# Patient Record
Sex: FEMALE | Race: WHITE | Employment: OTHER | URBAN - METROPOLITAN AREA
[De-identification: names, ages, dates, MRNs, and addresses within clinical notes are randomized per-mention and may not be internally consistent; named-entity substitution may affect disease eponyms.]

---

## 2023-01-29 ENCOUNTER — APPOINTMENT (OUTPATIENT)
Dept: CT IMAGING | Age: 73
End: 2023-01-29
Attending: STUDENT IN AN ORGANIZED HEALTH CARE EDUCATION/TRAINING PROGRAM
Payer: MEDICARE

## 2023-01-29 ENCOUNTER — HOSPITAL ENCOUNTER (EMERGENCY)
Age: 73
Discharge: ANOTHER HOSPITAL | End: 2023-01-30
Attending: STUDENT IN AN ORGANIZED HEALTH CARE EDUCATION/TRAINING PROGRAM
Payer: MEDICARE

## 2023-01-29 DIAGNOSIS — G45.9 TIA (TRANSIENT ISCHEMIC ATTACK): Primary | ICD-10-CM

## 2023-01-29 DIAGNOSIS — I65.21 RIGHT-SIDED EXTRACRANIAL CAROTID ARTERY STENOSIS: ICD-10-CM

## 2023-01-29 LAB
ALBUMIN SERPL-MCNC: 3.5 G/DL (ref 3.5–5)
ALBUMIN/GLOB SERPL: 1.1 (ref 1.1–2.2)
ALP SERPL-CCNC: 57 U/L (ref 45–117)
ALT SERPL-CCNC: 24 U/L (ref 12–78)
ANION GAP SERPL CALC-SCNC: 7 MMOL/L (ref 5–15)
APPEARANCE UR: ABNORMAL
AST SERPL W P-5'-P-CCNC: 23 U/L (ref 15–37)
BACTERIA URNS QL MICRO: ABNORMAL /HPF
BASOPHILS # BLD: 0.1 K/UL (ref 0–0.1)
BASOPHILS NFR BLD: 1 % (ref 0–1)
BILIRUB SERPL-MCNC: 0.3 MG/DL (ref 0.2–1)
BILIRUB UR QL: NEGATIVE
BUN SERPL-MCNC: 17 MG/DL (ref 6–20)
BUN/CREAT SERPL: 13 (ref 12–20)
CA-I BLD-MCNC: 9.1 MG/DL (ref 8.5–10.1)
CHLORIDE SERPL-SCNC: 105 MMOL/L (ref 97–108)
CO2 SERPL-SCNC: 27 MMOL/L (ref 21–32)
COLOR UR: ABNORMAL
CREAT SERPL-MCNC: 1.27 MG/DL (ref 0.55–1.02)
DIFFERENTIAL METHOD BLD: ABNORMAL
EOSINOPHIL # BLD: 0.3 K/UL (ref 0–0.4)
EOSINOPHIL NFR BLD: 4 % (ref 0–7)
EPITH CASTS URNS QL MICRO: ABNORMAL /LPF
ERYTHROCYTE [DISTWIDTH] IN BLOOD BY AUTOMATED COUNT: 14.2 % (ref 11.5–14.5)
ETHANOL SERPL-MCNC: <10 MG/DL
GLOBULIN SER CALC-MCNC: 3.3 G/DL (ref 2–4)
GLUCOSE BLD STRIP.AUTO-MCNC: 107 MG/DL (ref 65–100)
GLUCOSE SERPL-MCNC: 97 MG/DL (ref 65–100)
GLUCOSE UR STRIP.AUTO-MCNC: NEGATIVE MG/DL
HCT VFR BLD AUTO: 32.4 % (ref 35–47)
HGB BLD-MCNC: 10.8 G/DL (ref 11.5–16)
HGB UR QL STRIP: ABNORMAL
IMM GRANULOCYTES # BLD AUTO: 0.1 K/UL (ref 0–0.04)
IMM GRANULOCYTES NFR BLD AUTO: 1 % (ref 0–0.5)
INR PPP: 1.1 (ref 0.9–1.1)
KETONES UR QL STRIP.AUTO: NEGATIVE MG/DL
LEUKOCYTE ESTERASE UR QL STRIP.AUTO: ABNORMAL
LYMPHOCYTES # BLD: 0.9 K/UL (ref 0.8–3.5)
LYMPHOCYTES NFR BLD: 14 % (ref 12–49)
MCH RBC QN AUTO: 30.4 PG (ref 26–34)
MCHC RBC AUTO-ENTMCNC: 33.3 G/DL (ref 30–36.5)
MCV RBC AUTO: 91.3 FL (ref 80–99)
MONOCYTES # BLD: 0.5 K/UL (ref 0–1)
MONOCYTES NFR BLD: 9 % (ref 5–13)
MUCOUS THREADS URNS QL MICRO: ABNORMAL /LPF
NEUTS SEG # BLD: 4.6 K/UL (ref 1.8–8)
NEUTS SEG NFR BLD: 71 % (ref 32–75)
NITRITE UR QL STRIP.AUTO: POSITIVE
NRBC # BLD: 0 K/UL (ref 0–0.01)
NRBC BLD-RTO: 0 PER 100 WBC
PERFORMED BY, TECHID: ABNORMAL
PH UR STRIP: 5
PLATELET # BLD AUTO: 276 K/UL (ref 150–400)
PMV BLD AUTO: 9.1 FL (ref 8.9–12.9)
POTASSIUM SERPL-SCNC: 3.3 MMOL/L (ref 3.5–5.1)
PROT SERPL-MCNC: 6.8 G/DL (ref 6.4–8.2)
PROT UR STRIP-MCNC: NEGATIVE MG/DL
PROTHROMBIN TIME: 14.2 SEC (ref 11.9–14.6)
RBC # BLD AUTO: 3.55 M/UL (ref 3.8–5.2)
RBC #/AREA URNS HPF: ABNORMAL /HPF (ref 0–5)
SODIUM SERPL-SCNC: 139 MMOL/L (ref 136–145)
SP GR UR REFRACTOMETRY: 1.01 (ref 1–1.03)
TROPONIN-HIGH SENSITIVITY: 12 NG/L (ref 0–51)
UROBILINOGEN UR QL STRIP.AUTO: 0.1 EU/DL (ref 0.2–1)
WBC # BLD AUTO: 6.4 K/UL (ref 3.6–11)
WBC URNS QL MICRO: >100 /HPF (ref 0–4)

## 2023-01-29 PROCEDURE — 74011000636 HC RX REV CODE- 636: Performed by: STUDENT IN AN ORGANIZED HEALTH CARE EDUCATION/TRAINING PROGRAM

## 2023-01-29 PROCEDURE — 96375 TX/PRO/DX INJ NEW DRUG ADDON: CPT

## 2023-01-29 PROCEDURE — 99285 EMERGENCY DEPT VISIT HI MDM: CPT

## 2023-01-29 PROCEDURE — 85025 COMPLETE CBC W/AUTO DIFF WBC: CPT

## 2023-01-29 PROCEDURE — 82962 GLUCOSE BLOOD TEST: CPT

## 2023-01-29 PROCEDURE — 80053 COMPREHEN METABOLIC PANEL: CPT

## 2023-01-29 PROCEDURE — 93005 ELECTROCARDIOGRAM TRACING: CPT

## 2023-01-29 PROCEDURE — 84484 ASSAY OF TROPONIN QUANT: CPT

## 2023-01-29 PROCEDURE — 74011250636 HC RX REV CODE- 250/636: Performed by: STUDENT IN AN ORGANIZED HEALTH CARE EDUCATION/TRAINING PROGRAM

## 2023-01-29 PROCEDURE — 74011250637 HC RX REV CODE- 250/637: Performed by: STUDENT IN AN ORGANIZED HEALTH CARE EDUCATION/TRAINING PROGRAM

## 2023-01-29 PROCEDURE — 96374 THER/PROPH/DIAG INJ IV PUSH: CPT

## 2023-01-29 PROCEDURE — 81001 URINALYSIS AUTO W/SCOPE: CPT

## 2023-01-29 PROCEDURE — 82077 ASSAY SPEC XCP UR&BREATH IA: CPT

## 2023-01-29 PROCEDURE — 70450 CT HEAD/BRAIN W/O DYE: CPT

## 2023-01-29 PROCEDURE — 85610 PROTHROMBIN TIME: CPT

## 2023-01-29 PROCEDURE — 70496 CT ANGIOGRAPHY HEAD: CPT

## 2023-01-29 PROCEDURE — 36415 COLL VENOUS BLD VENIPUNCTURE: CPT

## 2023-01-29 PROCEDURE — 74011000250 HC RX REV CODE- 250: Performed by: STUDENT IN AN ORGANIZED HEALTH CARE EDUCATION/TRAINING PROGRAM

## 2023-01-29 RX ORDER — MINERAL OIL
ENEMA (ML) RECTAL
COMMUNITY
End: 2023-01-30

## 2023-01-29 RX ORDER — CYCLOBENZAPRINE HCL 10 MG
10 TABLET ORAL
COMMUNITY
Start: 2022-12-09

## 2023-01-29 RX ORDER — ERGOCALCIFEROL 1.25 MG/1
50000 CAPSULE ORAL
COMMUNITY

## 2023-01-29 RX ORDER — ESOMEPRAZOLE MAGNESIUM 40 MG/1
40 CAPSULE, DELAYED RELEASE ORAL
COMMUNITY
Start: 2023-01-06

## 2023-01-29 RX ORDER — PRAVASTATIN SODIUM 20 MG/1
20 TABLET ORAL
COMMUNITY
Start: 2022-12-09 | End: 2023-02-03

## 2023-01-29 RX ORDER — DIPHENHYDRAMINE HYDROCHLORIDE 50 MG/ML
25 INJECTION, SOLUTION INTRAMUSCULAR; INTRAVENOUS
Status: COMPLETED | OUTPATIENT
Start: 2023-01-29 | End: 2023-01-29

## 2023-01-29 RX ORDER — GABAPENTIN 600 MG/1
600 TABLET ORAL
COMMUNITY

## 2023-01-29 RX ORDER — DICLOFENAC SODIUM 10 MG/G
2 GEL TOPICAL
COMMUNITY

## 2023-01-29 RX ORDER — CARBIDOPA AND LEVODOPA 25; 100 MG/1; MG/1
1 TABLET ORAL
COMMUNITY
Start: 2023-01-03

## 2023-01-29 RX ORDER — ESCITALOPRAM OXALATE 20 MG/1
20 TABLET ORAL DAILY
COMMUNITY
Start: 2023-01-06

## 2023-01-29 RX ORDER — METOPROLOL TARTRATE 25 MG/1
25 TABLET, FILM COATED ORAL 2 TIMES DAILY
COMMUNITY
Start: 2022-12-09

## 2023-01-29 RX ORDER — CLOPIDOGREL BISULFATE 75 MG/1
75 TABLET ORAL DAILY
COMMUNITY
Start: 2023-01-03

## 2023-01-29 RX ORDER — LANOLIN ALCOHOL/MO/W.PET/CERES
325 CREAM (GRAM) TOPICAL
COMMUNITY

## 2023-01-29 RX ORDER — BUPROPION HYDROCHLORIDE 150 MG/1
300 TABLET, EXTENDED RELEASE ORAL DAILY
COMMUNITY
Start: 2023-01-03

## 2023-01-29 RX ORDER — FLUTICASONE PROPIONATE 50 MCG
SPRAY, SUSPENSION (ML) NASAL
COMMUNITY
End: 2023-01-30

## 2023-01-29 RX ORDER — GUAIFENESIN AND PHENYLEPHRINE HCL 400; 10 MG/1; MG/1
TABLET ORAL
COMMUNITY

## 2023-01-29 RX ORDER — ASCORBIC ACID 500 MG
500 TABLET ORAL
COMMUNITY

## 2023-01-29 RX ORDER — VITAMINS A AND D
CAPSULE ORAL
COMMUNITY

## 2023-01-29 RX ORDER — ARIPIPRAZOLE 15 MG/1
TABLET ORAL
COMMUNITY
Start: 2023-01-03

## 2023-01-29 RX ORDER — GUAIFENESIN 100 MG/5ML
81 LIQUID (ML) ORAL
Status: COMPLETED | OUTPATIENT
Start: 2023-01-29 | End: 2023-01-29

## 2023-01-29 RX ORDER — CHLORTHALIDONE 25 MG/1
25 TABLET ORAL DAILY
COMMUNITY
Start: 2022-12-09

## 2023-01-29 RX ADMIN — ASPIRIN 81 MG CHEWABLE TABLET 81 MG: 81 TABLET CHEWABLE at 22:43

## 2023-01-29 RX ADMIN — IOPAMIDOL 100 ML: 755 INJECTION, SOLUTION INTRAVENOUS at 20:24

## 2023-01-29 RX ADMIN — CEFTRIAXONE SODIUM 1 G: 1 INJECTION, POWDER, FOR SOLUTION INTRAMUSCULAR; INTRAVENOUS at 22:00

## 2023-01-29 RX ADMIN — DIPHENHYDRAMINE HYDROCHLORIDE 25 MG: 50 INJECTION INTRAMUSCULAR; INTRAVENOUS at 20:12

## 2023-01-30 ENCOUNTER — APPOINTMENT (OUTPATIENT)
Dept: NON INVASIVE DIAGNOSTICS | Age: 73
End: 2023-01-30
Attending: INTERNAL MEDICINE
Payer: MEDICARE

## 2023-01-30 ENCOUNTER — APPOINTMENT (OUTPATIENT)
Dept: MRI IMAGING | Age: 73
End: 2023-01-30
Attending: INTERNAL MEDICINE
Payer: MEDICARE

## 2023-01-30 ENCOUNTER — APPOINTMENT (OUTPATIENT)
Dept: GENERAL RADIOLOGY | Age: 73
End: 2023-01-30
Attending: INTERNAL MEDICINE
Payer: MEDICARE

## 2023-01-30 ENCOUNTER — HOSPITAL ENCOUNTER (INPATIENT)
Age: 73
LOS: 4 days | Discharge: HOME | End: 2023-02-03
Attending: INTERNAL MEDICINE | Admitting: INTERNAL MEDICINE
Payer: MEDICARE

## 2023-01-30 VITALS
RESPIRATION RATE: 12 BRPM | WEIGHT: 169.2 LBS | HEART RATE: 63 BPM | DIASTOLIC BLOOD PRESSURE: 71 MMHG | HEIGHT: 65 IN | SYSTOLIC BLOOD PRESSURE: 146 MMHG | TEMPERATURE: 98.1 F | OXYGEN SATURATION: 99 % | BODY MASS INDEX: 28.19 KG/M2

## 2023-01-30 DIAGNOSIS — G45.9 TIA (TRANSIENT ISCHEMIC ATTACK): Primary | ICD-10-CM

## 2023-01-30 DIAGNOSIS — I33.0 MITRAL VALVE VEGETATION: ICD-10-CM

## 2023-01-30 DIAGNOSIS — R93.1 ABNORMAL ECHOCARDIOGRAM: ICD-10-CM

## 2023-01-30 DIAGNOSIS — I63.9 ACUTE CVA (CEREBROVASCULAR ACCIDENT) (HCC): ICD-10-CM

## 2023-01-30 DIAGNOSIS — I65.21 INTERNAL CAROTID ARTERY STENOSIS, RIGHT: ICD-10-CM

## 2023-01-30 LAB
AMPHET UR QL SCN: NEGATIVE
ASPIRIN TEST, ASPIRN: 414 ARU
ATRIAL RATE: 76 BPM
BARBITURATES UR QL SCN: NEGATIVE
BENZODIAZ UR QL: NEGATIVE
BNP SERPL-MCNC: 373 PG/ML
CALCULATED P AXIS, ECG09: 83 DEGREES
CALCULATED R AXIS, ECG10: -9 DEGREES
CALCULATED T AXIS, ECG11: 52 DEGREES
CANNABINOIDS UR QL SCN: NEGATIVE
CHOLEST SERPL-MCNC: 191 MG/DL
COCAINE UR QL SCN: NEGATIVE
COMMENT, HOLDF: NORMAL
CRP SERPL-MCNC: 1.24 MG/DL (ref 0–0.6)
DIAGNOSIS, 93000: NORMAL
DRUG SCRN COMMENT,DRGCM: NORMAL
ECHO AO ROOT DIAM: 3.1 CM
ECHO AO ROOT INDEX: 1.68 CM/M2
ECHO AV AREA PEAK VELOCITY: 2.4 CM2
ECHO AV AREA/BSA PEAK VELOCITY: 1.3 CM2/M2
ECHO AV PEAK GRADIENT: 10 MMHG
ECHO AV PEAK VELOCITY: 1.6 M/S
ECHO AV VELOCITY RATIO: 0.75
ECHO LA DIAMETER INDEX: 2.17 CM/M2
ECHO LA DIAMETER: 4 CM
ECHO LA TO AORTIC ROOT RATIO: 1.29
ECHO LA VOL 2C: 69 ML (ref 22–52)
ECHO LA VOL 4C: 61 ML (ref 22–52)
ECHO LA VOLUME AREA LENGTH: 69 ML
ECHO LA VOLUME INDEX A2C: 38 ML/M2 (ref 16–34)
ECHO LA VOLUME INDEX A4C: 33 ML/M2 (ref 16–34)
ECHO LA VOLUME INDEX AREA LENGTH: 38 ML/M2 (ref 16–34)
ECHO LV E' LATERAL VELOCITY: 8 CM/S
ECHO LV E' SEPTAL VELOCITY: 6 CM/S
ECHO LV FRACTIONAL SHORTENING: 38 % (ref 28–44)
ECHO LV INTERNAL DIMENSION DIASTOLE INDEX: 2.28 CM/M2
ECHO LV INTERNAL DIMENSION DIASTOLIC: 4.2 CM (ref 3.9–5.3)
ECHO LV INTERNAL DIMENSION SYSTOLIC INDEX: 1.41 CM/M2
ECHO LV INTERNAL DIMENSION SYSTOLIC: 2.6 CM
ECHO LV IVSD: 1.2 CM (ref 0.6–0.9)
ECHO LV MASS 2D: 167.4 G (ref 67–162)
ECHO LV MASS INDEX 2D: 91 G/M2 (ref 43–95)
ECHO LV POSTERIOR WALL DIASTOLIC: 1.1 CM (ref 0.6–0.9)
ECHO LV RELATIVE WALL THICKNESS RATIO: 0.52
ECHO LVOT AREA: 3.1 CM2
ECHO LVOT DIAM: 2 CM
ECHO LVOT PEAK GRADIENT: 6 MMHG
ECHO LVOT PEAK VELOCITY: 1.2 M/S
ECHO MV A VELOCITY: 0.99 M/S
ECHO MV AREA PHT: 2.3 CM2
ECHO MV E DECELERATION TIME (DT): 325.9 MS
ECHO MV E VELOCITY: 0.73 M/S
ECHO MV E/A RATIO: 0.74
ECHO MV E/E' LATERAL: 9.13
ECHO MV E/E' RATIO (AVERAGED): 10.65
ECHO MV E/E' SEPTAL: 12.17
ECHO MV PRESSURE HALF TIME (PHT): 94.5 MS
ECHO PV MAX VELOCITY: 0.7 M/S
ECHO PV PEAK GRADIENT: 2 MMHG
ECHO RV FREE WALL PEAK S': 11 CM/S
ECHO RV INTERNAL DIMENSION: 3.7 CM
ECHO RV TAPSE: 1.7 CM (ref 1.7–?)
EST. AVERAGE GLUCOSE BLD GHB EST-MCNC: 108 MG/DL
FERRITIN SERPL-MCNC: 38 NG/ML (ref 26–388)
FOLATE SERPL-MCNC: 11.5 NG/ML (ref 5–21)
HBA1C MFR BLD: 5.4 % (ref 4–5.6)
HDLC SERPL-MCNC: 38 MG/DL
HDLC SERPL: 5 (ref 0–5)
IRON SATN MFR SERPL: 16 % (ref 20–50)
IRON SERPL-MCNC: 52 UG/DL (ref 35–150)
LDLC SERPL CALC-MCNC: 105.4 MG/DL (ref 0–100)
MAGNESIUM SERPL-MCNC: 2.3 MG/DL (ref 1.6–2.4)
METHADONE UR QL: NEGATIVE
OPIATES UR QL: NEGATIVE
P-R INTERVAL, ECG05: 220 MS
P2Y12 PLT RESPONSE,PPPR: 259 PRU (ref 194–418)
PCP UR QL: NEGATIVE
PHOSPHATE SERPL-MCNC: 3.9 MG/DL (ref 2.6–4.7)
Q-T INTERVAL, ECG07: 414 MS
QRS DURATION, ECG06: 80 MS
QTC CALCULATION (BEZET), ECG08: 465 MS
SAMPLES BEING HELD,HOLD: NORMAL
TIBC SERPL-MCNC: 334 UG/DL (ref 250–450)
TRIGL SERPL-MCNC: 238 MG/DL (ref ?–150)
TROPONIN-HIGH SENSITIVITY: 14 NG/L (ref 0–51)
TSH SERPL DL<=0.05 MIU/L-ACNC: 4.62 UIU/ML (ref 0.36–3.74)
UR CULT HOLD, URHOLD: NORMAL
UR CULT HOLD, URHOLD: NORMAL
VENTRICULAR RATE, ECG03: 76 BPM
VIT B12 SERPL-MCNC: 311 PG/ML (ref 193–986)
VLDLC SERPL CALC-MCNC: 47.6 MG/DL

## 2023-01-30 PROCEDURE — 74011250636 HC RX REV CODE- 250/636: Performed by: INTERNAL MEDICINE

## 2023-01-30 PROCEDURE — 82728 ASSAY OF FERRITIN: CPT

## 2023-01-30 PROCEDURE — 83540 ASSAY OF IRON: CPT

## 2023-01-30 PROCEDURE — 87086 URINE CULTURE/COLONY COUNT: CPT

## 2023-01-30 PROCEDURE — 85576 BLOOD PLATELET AGGREGATION: CPT

## 2023-01-30 PROCEDURE — 71045 X-RAY EXAM CHEST 1 VIEW: CPT

## 2023-01-30 PROCEDURE — 36415 COLL VENOUS BLD VENIPUNCTURE: CPT

## 2023-01-30 PROCEDURE — 80307 DRUG TEST PRSMV CHEM ANLYZR: CPT

## 2023-01-30 PROCEDURE — 92610 EVALUATE SWALLOWING FUNCTION: CPT

## 2023-01-30 PROCEDURE — 99285 EMERGENCY DEPT VISIT HI MDM: CPT

## 2023-01-30 PROCEDURE — 82607 VITAMIN B-12: CPT

## 2023-01-30 PROCEDURE — 87040 BLOOD CULTURE FOR BACTERIA: CPT

## 2023-01-30 PROCEDURE — 93306 TTE W/DOPPLER COMPLETE: CPT

## 2023-01-30 PROCEDURE — 97161 PT EVAL LOW COMPLEX 20 MIN: CPT

## 2023-01-30 PROCEDURE — 93306 TTE W/DOPPLER COMPLETE: CPT | Performed by: INTERNAL MEDICINE

## 2023-01-30 PROCEDURE — 99223 1ST HOSP IP/OBS HIGH 75: CPT | Performed by: NURSE PRACTITIONER

## 2023-01-30 PROCEDURE — 65270000046 HC RM TELEMETRY

## 2023-01-30 PROCEDURE — 80061 LIPID PANEL: CPT

## 2023-01-30 PROCEDURE — 74011000250 HC RX REV CODE- 250: Performed by: INTERNAL MEDICINE

## 2023-01-30 PROCEDURE — 84100 ASSAY OF PHOSPHORUS: CPT

## 2023-01-30 PROCEDURE — 83880 ASSAY OF NATRIURETIC PEPTIDE: CPT

## 2023-01-30 PROCEDURE — 74011250637 HC RX REV CODE- 250/637: Performed by: INTERNAL MEDICINE

## 2023-01-30 PROCEDURE — 84443 ASSAY THYROID STIM HORMONE: CPT

## 2023-01-30 PROCEDURE — 74011250637 HC RX REV CODE- 250/637: Performed by: NURSE PRACTITIONER

## 2023-01-30 PROCEDURE — 70551 MRI BRAIN STEM W/O DYE: CPT

## 2023-01-30 PROCEDURE — 83735 ASSAY OF MAGNESIUM: CPT

## 2023-01-30 PROCEDURE — 97112 NEUROMUSCULAR REEDUCATION: CPT | Performed by: OCCUPATIONAL THERAPIST

## 2023-01-30 PROCEDURE — 82746 ASSAY OF FOLIC ACID SERUM: CPT

## 2023-01-30 PROCEDURE — 84484 ASSAY OF TROPONIN QUANT: CPT

## 2023-01-30 PROCEDURE — 86140 C-REACTIVE PROTEIN: CPT

## 2023-01-30 PROCEDURE — 83036 HEMOGLOBIN GLYCOSYLATED A1C: CPT

## 2023-01-30 PROCEDURE — 97165 OT EVAL LOW COMPLEX 30 MIN: CPT | Performed by: OCCUPATIONAL THERAPIST

## 2023-01-30 RX ORDER — BISACODYL 5 MG
5 TABLET, DELAYED RELEASE (ENTERIC COATED) ORAL DAILY PRN
Status: DISCONTINUED | OUTPATIENT
Start: 2023-01-30 | End: 2023-02-03 | Stop reason: HOSPADM

## 2023-01-30 RX ORDER — ESCITALOPRAM OXALATE 10 MG/1
20 TABLET ORAL DAILY
Status: DISCONTINUED | OUTPATIENT
Start: 2023-01-30 | End: 2023-02-03 | Stop reason: HOSPADM

## 2023-01-30 RX ORDER — CARBIDOPA AND LEVODOPA 25; 100 MG/1; MG/1
1 TABLET ORAL DAILY
Status: DISCONTINUED | OUTPATIENT
Start: 2023-01-30 | End: 2023-02-03 | Stop reason: HOSPADM

## 2023-01-30 RX ORDER — POTASSIUM CHLORIDE 750 MG/1
40 TABLET, FILM COATED, EXTENDED RELEASE ORAL
Status: COMPLETED | OUTPATIENT
Start: 2023-01-30 | End: 2023-01-30

## 2023-01-30 RX ORDER — SODIUM CHLORIDE, SODIUM LACTATE, POTASSIUM CHLORIDE, CALCIUM CHLORIDE 600; 310; 30; 20 MG/100ML; MG/100ML; MG/100ML; MG/100ML
75 INJECTION, SOLUTION INTRAVENOUS CONTINUOUS
Status: DISCONTINUED | OUTPATIENT
Start: 2023-01-30 | End: 2023-01-30

## 2023-01-30 RX ORDER — CLOPIDOGREL BISULFATE 75 MG/1
75 TABLET ORAL DAILY
Status: DISCONTINUED | OUTPATIENT
Start: 2023-01-30 | End: 2023-02-03 | Stop reason: HOSPADM

## 2023-01-30 RX ORDER — BUPROPION HYDROCHLORIDE 150 MG/1
300 TABLET, EXTENDED RELEASE ORAL DAILY
Status: DISCONTINUED | OUTPATIENT
Start: 2023-01-30 | End: 2023-02-03 | Stop reason: HOSPADM

## 2023-01-30 RX ORDER — BUPROPION HYDROCHLORIDE 150 MG/1
150 TABLET, EXTENDED RELEASE ORAL
COMMUNITY

## 2023-01-30 RX ORDER — ACETAMINOPHEN 650 MG/1
650 SUPPOSITORY RECTAL
Status: DISCONTINUED | OUTPATIENT
Start: 2023-01-30 | End: 2023-02-03 | Stop reason: HOSPADM

## 2023-01-30 RX ORDER — BUPROPION HYDROCHLORIDE 150 MG/1
150 TABLET, EXTENDED RELEASE ORAL
Status: DISCONTINUED | OUTPATIENT
Start: 2023-01-30 | End: 2023-02-03 | Stop reason: HOSPADM

## 2023-01-30 RX ORDER — ACETAMINOPHEN 325 MG/1
650 TABLET ORAL
Status: DISCONTINUED | OUTPATIENT
Start: 2023-01-30 | End: 2023-02-03 | Stop reason: HOSPADM

## 2023-01-30 RX ORDER — GUAIFENESIN 100 MG/5ML
81 LIQUID (ML) ORAL DAILY
Status: DISCONTINUED | OUTPATIENT
Start: 2023-01-30 | End: 2023-02-03 | Stop reason: HOSPADM

## 2023-01-30 RX ORDER — ATORVASTATIN CALCIUM 40 MG/1
40 TABLET, FILM COATED ORAL
Status: DISCONTINUED | OUTPATIENT
Start: 2023-01-30 | End: 2023-02-03 | Stop reason: HOSPADM

## 2023-01-30 RX ORDER — PANTOPRAZOLE SODIUM 40 MG/1
40 TABLET, DELAYED RELEASE ORAL
Status: DISCONTINUED | OUTPATIENT
Start: 2023-01-30 | End: 2023-02-03 | Stop reason: HOSPADM

## 2023-01-30 RX ORDER — PRAVASTATIN SODIUM 20 MG/1
20 TABLET ORAL
Status: DISCONTINUED | OUTPATIENT
Start: 2023-01-30 | End: 2023-01-30

## 2023-01-30 RX ORDER — LANOLIN ALCOHOL/MO/W.PET/CERES
3 CREAM (GRAM) TOPICAL
Status: DISCONTINUED | OUTPATIENT
Start: 2023-01-30 | End: 2023-02-03 | Stop reason: HOSPADM

## 2023-01-30 RX ORDER — ONDANSETRON 2 MG/ML
4 INJECTION INTRAMUSCULAR; INTRAVENOUS
Status: DISCONTINUED | OUTPATIENT
Start: 2023-01-30 | End: 2023-02-03 | Stop reason: HOSPADM

## 2023-01-30 RX ORDER — ENOXAPARIN SODIUM 100 MG/ML
40 INJECTION SUBCUTANEOUS EVERY 24 HOURS
Status: DISCONTINUED | OUTPATIENT
Start: 2023-01-30 | End: 2023-02-03 | Stop reason: HOSPADM

## 2023-01-30 RX ADMIN — CEFTRIAXONE SODIUM 1 G: 1 INJECTION, POWDER, FOR SOLUTION INTRAMUSCULAR; INTRAVENOUS at 22:00

## 2023-01-30 RX ADMIN — ARIPIPRAZOLE 15 MG: 10 TABLET ORAL at 09:27

## 2023-01-30 RX ADMIN — ATORVASTATIN CALCIUM 40 MG: 40 TABLET, FILM COATED ORAL at 22:00

## 2023-01-30 RX ADMIN — POTASSIUM CHLORIDE 40 MEQ: 750 TABLET, FILM COATED, EXTENDED RELEASE ORAL at 07:06

## 2023-01-30 RX ADMIN — ASPIRIN 81 MG: 81 TABLET, CHEWABLE ORAL at 08:34

## 2023-01-30 RX ADMIN — BUPROPION HYDROCHLORIDE 300 MG: 150 TABLET, EXTENDED RELEASE ORAL at 11:07

## 2023-01-30 RX ADMIN — ESCITALOPRAM OXALATE 20 MG: 10 TABLET ORAL at 08:34

## 2023-01-30 RX ADMIN — SODIUM CHLORIDE, POTASSIUM CHLORIDE, SODIUM LACTATE AND CALCIUM CHLORIDE 75 ML/HR: 600; 310; 30; 20 INJECTION, SOLUTION INTRAVENOUS at 07:06

## 2023-01-30 RX ADMIN — CLOPIDOGREL BISULFATE 75 MG: 75 TABLET ORAL at 08:34

## 2023-01-30 RX ADMIN — PANTOPRAZOLE SODIUM 40 MG: 40 TABLET, DELAYED RELEASE ORAL at 08:34

## 2023-01-30 RX ADMIN — ENOXAPARIN SODIUM 40 MG: 100 INJECTION SUBCUTANEOUS at 07:06

## 2023-01-30 RX ADMIN — BUPROPION HYDROCHLORIDE 150 MG: 150 TABLET, EXTENDED RELEASE ORAL at 22:00

## 2023-01-30 RX ADMIN — CARBIDOPA AND LEVODOPA 1 TABLET: 25; 100 TABLET ORAL at 08:34

## 2023-01-30 NOTE — PROGRESS NOTES
Reason for Admission:  Acute CVA                     RUR Score:  12%                   Plan for utilizing home health:          PCP: First and Last name:  Lili Graham MD     Name of Practice:    Are you a current patient: Yes/No: Yes   Approximate date of last visit:  1/23/23   Can you participate in a virtual visit with your PCP:                     Current Advanced Directive/Advance Care Plan: Full Code      Healthcare Decision Maker:   Click here to complete [address] including selection of the Healthcare Decision Maker Relationship (ie \"Primary\")                             Transition of Care Plan:    Consult received for d/c planning. EMR reviewed. Noted transferred from [facility]. Met w/patient introduced to role of CM patient is A/OX4. History obtained lives home w/ spouse in 1 story home (4STE). [phone]  and [facility] [phone] . Patient lives home w/ spouse.  PTA independent w/ ADLs. Noted patient evaluated and cleared by PT/OT/SP . No transitional care needs verbalized at this time. [employer] is insurance providers. [facility] is local pharmacy. Care Management Interventions  PCP Verified by CM:  Yes  Last Visit to PCP: 01/23/23  Palliative Care Criteria Met (RRAT>21 & CHF Dx)?: No  Transition of Care Consult (CM Consult): Discharge Planning  MyChart Signup: No  Discharge Durable Medical Equipment: No  Health Maintenance Reviewed: Yes  Physical Therapy Consult: Yes  Occupational Therapy Consult: Yes  Speech Therapy Consult: Yes  Support Systems: Child(lay), Other Family Member(s), Spouse/Significant Other  [place] Resource Information Provided?: No  Discharge Location  Patient Expects to be Discharged to[de-identified]  (TBD)

## 2023-01-30 NOTE — ED PROVIDER NOTES
[facility] [place] EMERGENCY DEPT  EMERGENCY DEPARTMENT HISTORY AND PHYSICAL EXAM      Date: 1/29/2023  Patient Name: Johnathon Montano  MRN: [id number]  [place] 1950  Date of evaluation: 1/29/2023  Provider: Fartun Cosby MD   Note Started: 10:44 PM 1/29/23    HISTORY OF PRESENT ILLNESS     Chief Complaint   Patient presents with    Other       History Provided By: Patient    HPI: Johnathon Montano, 67 y.o. female presents for evaluation of transient slurred speech and left arm weakness. Symptoms lasted approximately 10 minutes and occurred one hour prior. No history of the same, no prior strokes. No associated gait disturbance. No other complaints. PAST MEDICAL HISTORY   Past Medical History:  Past Medical History:   Diagnosis Date    Depression     HTN (hypertension)        Past Surgical History:  Past Surgical History:   Procedure Laterality Date    HX CHOLECYSTECTOMY      HX GI      HX GYN      HX ORTHOPAEDIC      CT UNLISTED PROCEDURE CARDIAC SURGERY         Family History:  History reviewed. No pertinent family history. Social History:  Social History     Tobacco Use    Smoking status: Never    Smokeless tobacco: Never   Substance Use Topics    Alcohol use: Never    Drug use: Never       Allergies: Allergies   Allergen Reactions    Iodinated Contrast Media Hives     Okay with benedryl     Morphine Unknown (comments)       PCP: Catrachito Olmos MD    Current Meds:   Previous Medications    ARIPIPRAZOLE (ABILIFY) 15 MG TABLET        ASCORBIC ACID, VITAMIN C, (VITAMIN C) 500 MG TABLET    as directed.     BUPROPION SR (WELLBUTRIN SR) 150 MG SR TABLET        CARBIDOPA-LEVODOPA (SINEMET)  MG PER TABLET        CHLORTHALIDONE (HYGROTON) 25 MG TABLET        CLOPIDOGREL (PLAVIX) 75 MG TAB        CYCLOBENZAPRINE (FLEXERIL) 10 MG TABLET        DICLOFENAC (VOLTAREN) 1 % GEL    apply 4 grams to affected area as needed    ERGOCALCIFEROL (ERGOCALCIFEROL) 1,250 MCG (50,000 UNIT) CAPSULE    1 capsule ESCITALOPRAM OXALATE (LEXAPRO) 20 MG TABLET        ESOMEPRAZOLE (NEXIUM) 40 MG CAPSULE        FERROUS SULFATE 325 MG (65 MG IRON) TABLET    1 tablet    FEXOFENADINE (ALLEGRA) 180 MG TABLET    1 tablet    FLUTICASONE PROPIONATE (FLONASE ALLERGY RELIEF) 50 MCG/ACTUATION NASAL SPRAY    2 sprays each nostril    GABAPENTIN (NEURONTIN) 600 MG TABLET    1 capsule    METOPROLOL TARTRATE (LOPRESSOR) 25 MG TABLET        PRAVASTATIN (PRAVACHOL) 20 MG TABLET        PROCHLORPERAZINE (COMPAZINE) 5 MG TABLET    1-2 tabs po every 6 hours prn nausea    TRAMADOL (ULTRAM) 50 MG TABLET    Take 1 Tab by mouth every six (6) hours as needed for Pain. TURMERIC ROOT EXTRACT 500 MG CAP    1 tablet    VITAMINS A & D CAP    1 capsule       REVIEW OF SYSTEMS   Review of Systems   Constitutional:  Negative for fever. HENT:  Negative for congestion. Respiratory:  Negative for cough and shortness of breath. Cardiovascular:  Negative for chest pain. Gastrointestinal:  Negative for abdominal pain, constipation, nausea and vomiting. Genitourinary:  Negative for dysuria. Musculoskeletal:  Negative for arthralgias and myalgias. Skin:  Negative for rash. Allergic/Immunologic: Negative for immunocompromised state. Neurological:  Negative for syncope. Psychiatric/Behavioral:  Negative for confusion. Positives and Pertinent negatives as per HPI. PHYSICAL EXAM     ED Triage Vitals [01/29/23 1928]   ED Encounter Vitals Group      BP (!) 177/81      Pulse (Heart Rate) 65      Resp Rate 18      Temp 98.1 °F (36.7 °C)      Temp src       O2 Sat (%) 95 %      Weight 169 lb 3.2 oz      Height 5' 5\"     Physical Exam  Constitutional:       Appearance: Normal appearance. HENT:      Head: Normocephalic and atraumatic. Nose: Nose normal.      Mouth/Throat:      Mouth: Mucous membranes are moist.   Eyes:      Conjunctiva/sclera: Conjunctivae normal.      Pupils: Pupils are equal, round, and reactive to light.    Cardiovascular: Rate and Rhythm: Normal rate and regular rhythm. Heart sounds: Normal heart sounds. Pulmonary:      Effort: Pulmonary effort is normal.      Breath sounds: Normal breath sounds. Abdominal:      General: There is no distension. Palpations: Abdomen is soft. Tenderness: There is no abdominal tenderness. Musculoskeletal:         General: No tenderness or deformity. Normal range of motion. Cervical back: Normal range of motion and neck supple. Skin:     General: Skin is warm and dry. Neurological:      General: No focal deficit present. Mental Status: She is alert and oriented to person, place, and time. Cranial Nerves: No cranial nerve deficit. Sensory: No sensory deficit. Motor: No weakness. Coordination: Coordination normal.   Psychiatric:         Mood and Affect: Mood normal.         Behavior: Behavior normal.          SCREENINGS               No data recorded        LAB, EKG AND DIAGNOSTIC RESULTS   Labs:  Recent Results (from the past 12 hour(s))   GLUCOSE, POC    Collection Time: 01/29/23  7:34 PM   Result Value Ref Range    Glucose (POC) 107 (H) 65 - 100 mg/dL    Performed by [employer]    CBC WITH AUTOMATED DIFF    Collection Time: 01/29/23  8:09 PM   Result Value Ref Range    WBC 6.4 3.6 - 11.0 K/uL    RBC 3.55 (L) 3.80 - 5.20 M/uL    HGB 10.8 (L) 11.5 - 16.0 g/dL    HCT 32.4 (L) 35.0 - 47.0 %    MCV 91.3 80.0 - 99.0 FL    MCH 30.4 26.0 - 34.0 PG    MCHC 33.3 30.0 - 36.5 g/dL    RDW 14.2 11.5 - 14.5 %    PLATELET 559 705 - 962 K/uL    MPV 9.1 8.9 - 12.9 FL    NRBC 0.0 0.0  WBC    ABSOLUTE NRBC 0.00 0.00 - 0.01 K/uL    NEUTROPHILS 71 32 - 75 %    LYMPHOCYTES 14 12 - 49 %    MONOCYTES 9 5 - 13 %    EOSINOPHILS 4 0 - 7 %    BASOPHILS 1 0 - 1 %    IMMATURE GRANULOCYTES 1 (H) 0 - 0.5 %    ABS. NEUTROPHILS 4.6 1.8 - 8.0 K/UL    ABS. LYMPHOCYTES 0.9 0.8 - 3.5 K/UL    ABS. MONOCYTES 0.5 0.0 - 1.0 K/UL    ABS. EOSINOPHILS 0.3 0.0 - 0.4 K/UL    ABS. BASOPHILS 0.1 0.0 - 0.1 K/UL    ABS. IMM. GRANS. 0.1 (H) 0.00 - 0.04 K/UL    DF AUTOMATED     PROTHROMBIN TIME + INR    Collection Time: 01/29/23  8:09 PM   Result Value Ref Range    Prothrombin time 14.2 11.9 - 14.6 sec    INR 1.1 0.9 - 1.1     METABOLIC PANEL, COMPREHENSIVE    Collection Time: 01/29/23  8:09 PM   Result Value Ref Range    Sodium 139 136 - 145 mmol/L    Potassium 3.3 (L) 3.5 - 5.1 mmol/L    Chloride 105 97 - 108 mmol/L    CO2 27 21 - 32 mmol/L    Anion gap 7 5 - 15 mmol/L    Glucose 97 65 - 100 mg/dL    BUN 17 6 - 20 mg/dL    Creatinine 1.27 (H) 0.55 - 1.02 mg/dL    BUN/Creatinine ratio 13 12 - 20      eGFR 45 (L) >60 ml/min/1.73m2    Calcium 9.1 8.5 - 10.1 mg/dL    Bilirubin, total 0.3 0.2 - 1.0 mg/dL    AST (SGOT) 23 15 - 37 U/L    ALT (SGPT) 24 12 - 78 U/L    Alk.  phosphatase 57 45 - 117 U/L    Protein, total 6.8 6.4 - 8.2 g/dL    Albumin 3.5 3.5 - 5.0 g/dL    Globulin 3.3 2.0 - 4.0 g/dL    A-G Ratio 1.1 1.1 - 2.2     URINALYSIS W/ RFLX MICROSCOPIC    Collection Time: 01/29/23  8:09 PM   Result Value Ref Range    Color Yellow/Straw      Appearance Hazy (A) Clear      Specific gravity 1.010 1.003 - 1.030      pH (UA) 5.0      Protein Negative Negative mg/dL    Glucose Negative Negative mg/dL    Ketone Negative Negative mg/dL    Bilirubin Negative Negative      Blood Small (A) Negative      Urobilinogen 0.1 (L) 0.2 - 1.0 EU/dL    Nitrites Positive (A) Negative      Leukocyte Esterase Large (A) Negative     TROPONIN-HIGH SENSITIVITY    Collection Time: 01/29/23  8:09 PM   Result Value Ref Range    Troponin-High Sensitivity 12 0 - 51 ng/L   ETHYL ALCOHOL    Collection Time: 01/29/23  8:09 PM   Result Value Ref Range    ALCOHOL(ETHYL),SERUM <10 <10 mg/dL   URINE MICROSCOPIC    Collection Time: 01/29/23  8:09 PM   Result Value Ref Range    WBC >100 (H) 0 - 4 /hpf    RBC 10-20 0 - 5 /hpf    Epithelial cells Many (A) Few /lpf    Bacteria 1+ (A) Negative /hpf    Mucus Trace (A) Negative /lpf Radiologic Studies:  Interpretation per the Radiologist below, if available at the time of this note:  CTA CODE NEURO HEAD AND NECK W CONT    Result Date: 1/29/2023  CLINICAL HISTORY: Stroke EXAMINATION:  CT ANGIOGRAPHY HEAD AND NECK COMPARISON: September 2014 TECHNIQUE:  Following the uneventful administration of iodinated contrast material, axial CT angiography of the head and neck was performed. Delayed axial images through the head were also obtained. Coronal and sagittal reconstructions were obtained. Manual postprocessing of images was performed. 3-D  Sagittal maximal intensity projection images were obtained. 3-D Coronal maximal intensity projections were obtained. CT dose reduction was achieved through use of a standardized protocol tailored for this examination and automatic exposure control for dose modulation. This study was analyzed by the [address] ai algorithm. FINDINGS: Delayed contrast-enhanced head CT: The ventricles are midline without hydrocephalus. There is no acute intra or extra-axial hemorrhage. Periventricular white matter hypodensities indicative of chronic microvascular angiopathy. The basal cisterns are clear. The paranasal sinuses are clear. CTA NECK: Great vessels: Normal arch anatomy with the origins patent. Right subclavian artery: Patent Left subclavian artery: Patent Right common carotid artery: Patent Left common carotid artery: Patent Cervical right internal carotid artery: Dense calcified plaque with 80-90% stenosis by NASCET criteria. Cervical left internal carotid artery: Mild calcified plaque with no significant stenosis by NASCET criteria. Right vertebral artery: Nondominant, patent Left vertebral artery: Dominant, patent Atelectasis in the medial right upper lobe. The thyroid is homogeneous. No cervical lymphadenopathy.  CTA HEAD: Right cavernous internal carotid artery: Patent Left cavernous internal carotid artery: Patent Anterior cerebral arteries: Patent Anterior communicating artery: Patent Right middle cerebral artery: Patent Left middle cerebral artery: Patent Posterior communicating arteries: Patent Posterior cerebral arteries: Diminutive Basilar artery: Patent Distal vertebral arteries: Patent No evidence for intracranial aneurysm or hemodynamically significant stenosis. 1.  Severe right internal carotid artery stenosis. 2.  No intracranial large vessel occlusion. CT CODE NEURO HEAD WO CONTRAST    Result Date: 1/29/2023  Indication:  Stroke alert Comparison: CT September 2014 Findings: 5 mm axial images were obtained from the skull base through the vertex. CT dose reduction was achieved through the use of a standardized protocol tailored for this examination and automatic exposure control for dose modulation. The ventricles and cortical sulci are prominent, compatible with age related volume loss. There is no evidence of intracranial hemorrhage, mass, mass effect, or acute infarct. There is a chronic white matter infarct in the right frontal lobe. There is periventricular white matter disease. No extra-axial fluid collections are seen. The visualized paranasal sinuses and mastoid air cells are clear. The orbital structures are unremarkable. No osseous abnormalities are seen. 1. No evidence of acute infarct or intracranial hemorrhage. 2. Periventricular white matter disease is likely secondary to chronic small vessel ischemic changes.        PROCEDURES   Unless otherwise noted below, none  Performed by: Eren Granados MD   Procedures        EMERGENCY DEPARTMENT COURSE and DIFFERENTIAL DIAGNOSIS/MDM   Vitals:    Vitals:    01/29/23 1928 01/29/23 2028 01/29/23 2043 01/29/23 2055   BP: (!) 177/81 (!) 171/69 (!) 155/83 (!) 166/73   Pulse: 65  65 62   Resp: 18  15 16   Temp: 98.1 °F (36.7 °C)      SpO2: 95%  99% 98%   Weight: 76.7 kg (169 lb 3.2 oz)      Height: 5' 5\" (1.651 m)           Patient was given the following medications:  Medications diphenhydrAMINE (BENADRYL) injection 25 mg (25 mg IntraVENous Given 1/29/23 2012)   iopamidoL (ISOVUE-370) 370 mg iodine /mL (76 %) injection 100 mL (100 mL IntraVENous Given 1/29/23 2024)   cefTRIAXone (ROCEPHIN) 1 g in sterile water (preservative free) 10 mL IV syringe (1 g IntraVENous Given 1/29/23 2200)   aspirin chewable tablet 81 mg (81 mg Oral Given 1/29/23 2243)       CC/HPI Summary, DDx, ED Course, and Reassessment:   78yo F presents for evaluation of transient dysarthria and left arm weakness. Concerned primarily for TIA, intact neuro exam now. Will evaluate with CT and CTA, Neurology consult and broad labwork. ED Course as of 01/29/23 2327   Kayleen Osei Jan 29, 2023 1952 ECG performed at 1928 and interpreted by me shows sinus rhythm with first-degree AV block, HI interval is 220, no ST elevation or depression [BQ]   2006 Acute left-sided facial droop and dysarthria. Patient upgraded to level 1 stroke alert [BQ]   2106 CTA CODE NEURO HEAD AND NECK W CONT  IMPRESSION  1. Severe right internal carotid artery stenosis. 2.  No intracranial large vessel occlusion. [BQ]   2215 Spoke with [facility] who recommends admission for MRI. Facial droop and slurred speech have since resolved. [BQ]   [address] [place]  Consultant: Evan Barboza  Specialty: Neurointerventional Radioloy  Recommends transfer to [facility] for possible intervention. [BQ]   230 Hospitalist Rima accepts transfer [BQ]      ED Course User Index  [BQ] Leia Erazo MD           ED FINAL IMPRESSION     1. TIA (transient ischemic attack)    2. Right-sided extracranial carotid artery stenosis          DISPOSITION/PLAN       Transfer: The patient is being transferred to [facility] for Neuro IR. The results of their tests and reasons for their transfer have been discussed with the patient and/or available family. The patient/family has conveyed agreement and understanding for the need to be admitted and for their admission diagnosis.  Consultation has been made with Dr. Kamari Tam, who agrees to accept the transfer. I am the Primary Clinician of Record. Geraldine Marlow MD (electronically signed)    (Please note that parts of this dictation were completed with voice recognition software. Quite often unanticipated grammatical, syntax, homophones, and other interpretive errors are inadvertently transcribed by the computer software. Please disregards these errors.  Please excuse any errors that have escaped final proofreading.)

## 2023-01-30 NOTE — PROGRESS NOTES
PHYSICAL THERAPY EVALUATION- NEURO POPULATION/ DISCHARGE  Patient: Rohan Deleon (71 y.o. female)  Date: 1/30/2023  Primary Diagnosis: Acute CVA (cerebrovascular accident) [facility]) [I63.9]       Precautions:   (standard)      ASSESSMENT  Based on the objective data described below, the patient presents at her functional baseline s/p admission for CVA work up. CT shows no acute abnormality, MRI pending at the time of this assessment. Received pt resting on the ED plinth reporting resolution of her symptoms (slurred speech). Pt demonstrates functional strength, ROM and coordination. She presents with baseline balance impairment and has h/o falls in the community (most recent about a month ago) therefore is generally supervision with transfers and ambulation. Her gait is mildly unsteady though w/ no overt LOB today. She has not received physical therapy for her balance or gait impairment. Educated pt in the role of OP PT for her balance, recommended she consider. Completed education re: BEFAST. Additional acute PT is not indicated at this time d/t pt is at baseline w/ no new neuro impairments. Will sign off. Please re-consult if needs arise. Current Level of Function Impacting Discharge (mobility/balance): Independent bed mobility; Supervision transfers and ambulating w/o a device    Functional Outcome Measure: The patient scored Total: 43/56 on the River[place] Balance Assessment which is indicative of low fall risk.     Other factors to consider for discharge: as documented above       PLAN :  Recommendation for discharge: (in order for the patient to meet his/her long term goals)  Outpatient physical therapy follow up recommended for chronic balance and gait impairment    This discharge recommendation:  Has not yet been discussed the attending provider and/or case management    IF patient discharges home will need the following DME: patient owns DME required for discharge         SUBJECTIVE:   Patient stated I don't have any clothes on.    OBJECTIVE DATA SUMMARY:   HISTORY:    Past Medical History:   Diagnosis Date    Depression     HTN (hypertension)      Past Surgical History:   Procedure Laterality Date    HX CHOLECYSTECTOMY      HX GI      HX GYN      HX ORTHOPAEDIC      [place] UNLISTED PROCEDURE CARDIAC SURGERY         Personal factors and/or comorbidities impacting plan of care: PMH, H/o balance impairment w/ falls in the community    Home Situation  Home Environment: Private residence  # Steps to Enter: 3  Rails to Enter: Yes  Hand Rails : Bilateral  One/Two Story Residence: One story  Living Alone: No  Support Systems: Spouse/Significant Other, Child(lay) (; daughter lives close)  Patient Expects to be Discharged to[de-identified] Home  Current DME Used/Available at Home: pau Espinosa (RW from back sx years ago. She does not currenlty use.)  Tub or Shower Type: Tub/Shower combination    EXAMINATION/PRESENTATION/DECISION MAKING:   Critical Behavior:  Neurologic State: Alert, Appropriate for age  Orientation Level: Oriented X4  Cognition: Appropriate decision making, Appropriate safety awareness  Safety/Judgement: Awareness of environment, Driving appropriateness, Fall prevention, Good awareness of safety precautions, Home safety, Insight into deficits  Hearing:   Auditory  Auditory Impairment: None    Range Of Motion:  AROM: Generally decreased, functional (LEs)           PROM: Generally decreased, functional (B shoulder imp- h/o B RTC repairs)           Strength:    Strength: Generally decreased, functional (LEs)                    Tone & Sensation:   Tone: Normal              Sensation: Intact               Coordination:  Coordination: Generally decreased, functional  Vision:   Tracking: Able to track stimulus in all quadrants w/o difficulty  Saccades: Within Defined Limits  Convergence: Normal (within 6 inches from nose)  Diplopia: No  Acuity: Within Defined Limits  Corrective Lenses: Glasses  Functional Mobility:  Bed Mobility:  Rolling: Independent  Supine to Sit: Independent  Sit to Supine: Independent  Scooting: Independent  Transfers:  Sit to Stand: Supervision (mild LOB but able to self correct)  Stand to Sit: Supervision        Bed to Chair: Supervision              Balance:   Sitting: Intact; Without support  Standing: Impaired; Without support  Standing - Static: Fair;Good;Occasional  Standing - Dynamic : Fair;Good (mild unsteadiness w/ no overt LOB ambulating)  Ambulation/Gait Training:  Distance (ft): 150 Feet (ft)  Assistive Device: Gait belt  Ambulation - Level of Assistance: Supervision;Assist x1     Gait Description (WDL): Exceptions to WDL  Gait Abnormalities: Decreased step clearance        Base of Support: Narrowed     Speed/Thao: Slow  Step Length: Right shortened;Left shortened                     Functional Measure  Rivero Balance Test:    Sitting to Standing: 3  Standing Unsupported: 4  Sitting with Back Unsupported: 4  Standing to Sittin  Transfers: 4  Standing Unsupported with Eyes Closed: 3  Standing Unsupported with Feet Together: 3  Reach Forward with Outstretched Arm: 4   Object: 3  Turn to Look Over Shoulders: 4  Turn 360 Degrees: 1  Alternate Foot on Step/Stool: 2  Standing Unsupported One Foot in Front: 2  Stand on One Le  Total: 43/56         56=Maximum possible score;   0-20=High fall risk  21-40=Moderate fall risk   41-56=Low fall risk        Physical Therapy Evaluation Charge Determination   History Examination Presentation Decision-Making   MEDIUM  Complexity : 1-2 comorbidities / personal factors will impact the outcome/ POC  LOW Complexity : 1-2 Standardized tests and measures addressing body structure, function, activity limitation and / or participation in recreation  LOW Complexity : Stable, uncomplicated  LOW Complexity : FOTO score of       Based on the above components, the patient evaluation is determined to be of the following complexity level: LOW Pain Rating:  None indicated    Activity Tolerance:   Good      After treatment patient left in no apparent distress:   Supine in bed, Call bell within reach, Caregiver / family present, and Side rails x 3    COMMUNICATION/EDUCATION:   The patients plan of care was discussed with: Occupational therapist and Registered nurse. Patient was educated regarding her deficit(s) of resolved slurred speech as this relates to her diagnosis of CVA work up. She demonstrated Good understanding as evidenced by awareness. Patient and/or family was verbally educated on the BE FAST acronym for signs/symptoms of CVA and TIA. BE FAST handout was given to pt for visual education and reinforcement. Fall prevention education was provided and the patient/caregiver indicated understanding., Patient/family have participated as able in goal setting and plan of care. , and Patient/family agree to work toward stated goals and plan of care.     Thank you for this referral.  Eduardo Strauss, PT   Time Calculation: 18 mins

## 2023-01-30 NOTE — ED NOTES
TRANSFER - OUT REPORT:    Verbal report given to Amanuel Quevedo RN on [address]  being transferred to [facility] ED for routine progression of care       Report consisted of patients Situation, Background, Assessment and   Recommendations(SBAR). Information from the following report(s) SBAR, ED Summary and MAR was reviewed with the receiving nurse. Lines:   Peripheral IV 01/29/23 Anterior;Right Forearm (Active)        Opportunity for questions and clarification was provided.       Patient transported with:   Corie Fatima

## 2023-01-30 NOTE — PROGRESS NOTES
SLP Contact Note    Consult received and appreciated. Patient chart reviewed. CT negative for acute infarct. Slurred speech has resolved. Pt passed swallow screen- can initiate diet accordingly when appropriate. Will await MRI and can complete evaluation if indicated.       Thank you,  EMILE VázquezEd, [id number] [facility]  Speech-Language Pathologist

## 2023-01-30 NOTE — ED TRIAGE NOTES
Pt arrives via EMS as a transfer from [facility] after having slurred speech and right facial droop for about a 5 minute period. Pt initially went to [facility] for slurred speech that had resolved by the time she got there.

## 2023-01-30 NOTE — ED NOTES
Bedside shift change report given to Jennifer (oncoming nurse) by Bettylou Severance (offgoing nurse). Report included the following information SBAR, Kardex, ED Summary, Intake/Output, MAR, Cardiac Rhythm NSR, and Quality Measures.

## 2023-01-30 NOTE — CONSULTS
NEUROLOGY CONSULT NOTE    Name Gene Benitez Age 67 y.o. MRN [id number]  1950     Consulting Physician: Elke Maciel DO      Chief Complaint: Embolic strokes     Assessment:     Principal Problem:    Acute CVA (cerebrovascular accident) [facility]) (2023)        Patient is a 67year-old RH female with history of HTN, CAD with stent x1 (placed 13 yrs ago remained on Plavix daily) who had episode of slurred speech last evening around 1830 lasting 15 mins. and then while at [facility] ED had slurred speech with LUE weakness and R facial droop lasting 5 mins with no recurrence of symptoms. CTA showed R ICA 80-90% stenosis and pt was subsequently transferred here. MRI brain showed multiple small infarcts to R MCA territory. TTE revealed EF 60-65% and showed concern for moderate sized mobile vegetation on the anterior leaflet of the mitral valve. UA (23) large LE, +Nit, >100 WBC, 1+montez  .4, HgbA1C 5.4  Recommendations:   1.) R MCA acute embolic strokes:  - Etiology likely MV vegetation, however, patient also has severe R ICA stenosis   - NIS consulted with plan for stent, however, given TTE findings will defer to plan from Cardiology for MV vegetation and may place stent outpatient. - Follow-up Cardiology consult  - Continue DAPT with with aspirin 81 mg daily and Plavix 75 mg daily. Check aspirin and P2Y12 assays. P2Y12 subtherapeutic (259). Reload and recheck per NIS, may need Brilinta. - Start atorvastatin 40 mg for goal LDL <70  - Symptoms do not seem to be pressure dependent. Goal -180.  - PT/OT, SLP evals    We will follow-up with the patient tomorrow. History of Present Illness: This is a 67 y.o. R-handed female with history of HTN, CAD with stent x1 (placed 13 yrs ago on Plavix) who was at her daughter's house yesterday around 200 and began having slurred speech. Her grandson witnessed it and told her she was talking abnormally.  She had no other associated symptoms at the time. She states the symptoms resolved after ~15 mins. She presented to [address] [place] [address] and CTA demonstrated R ICA stenosis 80-90%. CT head showed no abnormality except periventricular WM disease. She had another event of slurred speech in their ED that lasted ~5 mins but this time with associated LUE weakness which patient remembers but documentation states there was also a R facial droop but then resolved. She has had no other associated symptoms since. She denies vision changes, numbness, presyncope, dizziness, headache, palpitations or SOB. She does state she had some gait problems today with PT. Patient was transferred to [facility] ED for further evaluation and possible intervention for R ICA stenosis. We are asked to consult on the patient for symptomatic stenosis with concern for stroke versus TIA. Allergies   Allergen Reactions    Iodinated Contrast Media Hives     Okay with benedryl     Morphine Unknown (comments)        Prior to Admission medications    Medication Sig Start Date End Date Taking? Authorizing Provider   buPROPion SR [facility] SR) 150 mg SR tablet Take 150 mg by mouth nightly. Yes Provider, Historical   clopidogreL (PLAVIX) 75 mg tab Take 75 mg by mouth daily. 1/3/23  Yes Other, MD Cuong   chlorthalidone (HYGROTON) 25 mg tablet Take 25 mg by mouth daily. 12/9/22  Yes Other, MD Cuong   ARIPiprazole (ABILIFY) 15 mg tablet  1/3/23  Yes Other, MD Cuong   buPROPion SR (WELLBUTRIN SR) 150 mg SR tablet Take 300 mg by mouth daily. 1/3/23  Yes Other, MD Cuong   carbidopa-levodopa (SINEMET)  mg per tablet Take 1 Tablet by mouth nightly. 1/3/23  Yes Other, MD Cuong   ascorbic acid, vitamin C, (VITAMIN C) 500 mg tablet 500 mg nightly. Yes Other, MD Cuong   turmeric root extract 500 mg cap 1 tablet   Yes Other, MD Cuong   vitamins a & d cap 1 capsule   Yes Other, MD Cuong   escitalopram oxalate (LEXAPRO) 20 mg tablet Take 20 mg by mouth daily.  1/6/23  Yes Other, MD Cuong esomeprazole (NEXIUM) 40 mg capsule Take 40 mg by mouth Daily (before breakfast). Indications: gastroesophageal reflux disease 1/6/23  Yes Cuong Archer MD   pravastatin (PRAVACHOL) 20 mg tablet Take 20 mg by mouth nightly. Indications: high cholesterol 12/9/22  Yes Cuong Archer MD   metoprolol tartrate (LOPRESSOR) 25 mg tablet Take 25 mg by mouth two (2) times a day. 12/9/22   Cuong Archer MD   cyclobenzaprine (FLEXERIL) 10 mg tablet Take 10 mg by mouth two (2) times daily as needed for Muscle Spasm(s) (back pain). 12/9/22   Cuong Archer MD   diclofenac (VOLTAREN) 1 % gel Apply 2 g to affected area two (2) times daily as needed for Pain (to knees and feet as needed for pain). Cuong Archer MD   ergocalciferol (ERGOCALCIFEROL) 1,250 mcg (50,000 unit) capsule Take 50,000 Units by mouth every Monday and Thursday. Cuong Archer MD   ferrous sulfate 325 mg (65 mg iron) tablet Take 325 mg by mouth daily (with breakfast). Cuong Archer MD   gabapentin (NEURONTIN) 600 mg tablet Take 600 mg by mouth daily as needed for Pain (BACK PAIN). Cuong Archer MD   prochlorperazine (COMPAZINE) 5 mg tablet 1-2 tabs po every 6 hours prn nausea 8/10/13   Sepideh Jacome MD   traMADol Duane Pretty) 50 mg tablet Take 1 Tab by mouth every six (6) hours as needed for Pain. Patient taking differently: Take 50 mg by mouth three (3) times daily as needed. Indications: pain 8/10/13   Sepideh Jacome MD       Past Medical History:   Diagnosis Date    Depression     HTN (hypertension)         Past Surgical History:   Procedure Laterality Date    HX CHOLECYSTECTOMY      HX GI      HX GYN      HX ORTHOPAEDIC      SD UNLISTED PROCEDURE CARDIAC SURGERY          Social History     Tobacco Use    Smoking status: Never    Smokeless tobacco: Never   Substance Use Topics    Alcohol use: Never        No family history on file. Review of Systems:   Comprehensive review of systems performed and negative except for as listed above. Exam:     Visit Vitals  /66   Pulse 78   Temp 98.3 °F (36.8 °C)   Resp 21   Ht 5' 5\" (1.651 m)   Wt 76.7 kg (169 lb)   SpO2 97%   BMI 28.12 kg/m²        General: Well developed, well nourished. Patient in no apparent distress   Head: Normocephalic, atraumatic, anicteric sclera   Lungs:  Clear to auscultation bilaterally, No wheezes or rubs   Cardiac: Regular rate and rhythm with no murmurs. Abd: Bowel sounds were audible. No tenderness on palpation   Ext: No pedal edema   Skin: No overt signs of rash     Neurological Exam:  Mental Status: A&Ox3   Speech: Fluent no aphasia or dysarthria. Cranial Nerves:   VFF. Facial sensation is normal. Facial movement is symmetric. Palate is midline. Normal sternocleidomastoid strength. Tongue is midline. Hearing is intact bilaterally. Eyes: PERRL, EOM's full, no nystagmus, no ptosis. Motor:  FC x4 5/5. Normal bulk and tone. Reflexes:   DTR 2+/4 and symmetrical.  Toes downgoing bilat. Sensory:   SILT bilat throughout   Gait:  Deferred   Tremor:   No tremor noted. Cerebellar:  FTN and HTS intact   Neurovascular: No carotid bruits. Imaging  CT Results (maximum last 3): Results from [place] encounter on 01/29/23    CT CODE NEURO HEAD WO CONTRAST    Narrative  Indication:  Stroke alert    Comparison: CT September 2014    Findings: 5 mm axial images were obtained from the skull base through the  vertex. CT dose reduction was achieved through the use of a standardized protocol  tailored for this examination and automatic exposure control for dose  modulation. The ventricles and cortical sulci are prominent, compatible with age related  volume loss. There is no evidence of intracranial hemorrhage, mass, mass effect,  or acute infarct. There is a chronic white matter infarct in the right frontal  lobe. There is periventricular white matter disease. No extra-axial fluid  collections are seen.  The visualized paranasal sinuses and mastoid air cells are  clear. The orbital structures are unremarkable. No osseous abnormalities are  seen. Impression  1. No evidence of acute infarct or intracranial hemorrhage. 2. Periventricular white matter disease is likely secondary to chronic small  vessel ischemic changes. CTA CODE NEURO HEAD AND NECK W CONT    Narrative  CLINICAL HISTORY: Stroke    EXAMINATION:  CT ANGIOGRAPHY HEAD AND NECK    COMPARISON: September 2014    TECHNIQUE:  Following the uneventful administration of iodinated contrast  material, axial CT angiography of the head and neck was performed. Delayed axial  images through the head were also obtained. Coronal and sagittal reconstructions  were obtained. Manual postprocessing of images was performed. 3-D  Sagittal  maximal intensity projection images were obtained. 3-D Coronal maximal  intensity projections were obtained. CT dose reduction was achieved through use  of a standardized protocol tailored for this examination and automatic exposure  control for dose modulation. This study was analyzed by the [address] ai algorithm. FINDINGS:    Delayed contrast-enhanced head CT:    The ventricles are midline without hydrocephalus. There is no acute intra or  extra-axial hemorrhage. Periventricular white matter hypodensities indicative of  chronic microvascular angiopathy. The basal cisterns are clear. The paranasal  sinuses are clear. CTA NECK:    Great vessels: Normal arch anatomy with the origins patent. Right subclavian artery: Patent    Left subclavian artery: Patent    Right common carotid artery: Patent    Left common carotid artery: Patent    Cervical right internal carotid artery: Dense calcified plaque with 80-90%  stenosis by NASCET criteria. Cervical left internal carotid artery: Mild calcified plaque with no significant  stenosis by NASCET criteria.     Right vertebral artery: Nondominant, patent    Left vertebral artery: Dominant, patent    Atelectasis in the medial right upper lobe. The thyroid is homogeneous. No  cervical lymphadenopathy. CTA HEAD:    Right cavernous internal carotid artery: Patent    Left cavernous internal carotid artery: Patent    Anterior cerebral arteries: Patent    Anterior communicating artery: Patent    Right middle cerebral artery: Patent    Left middle cerebral artery: Patent    Posterior communicating arteries: Patent    Posterior cerebral arteries: Diminutive    Basilar artery: Patent    Distal vertebral arteries: Patent    No evidence for intracranial aneurysm or hemodynamically significant stenosis. Impression  1. Severe right internal carotid artery stenosis. 2.  No intracranial large vessel occlusion. Results from [place] encounter on 08/10/13    CT ABD PELV WO CONT    Narrative  **Final Report**      ICD Codes / Adm. Diagnosis: 305783  [id number] / Abdominal Pain  Hypotension  Examination:  CT ABD PELVIS WO CON  - [id number] - Aug 10 2013  4:46PM  Accession No:  [id number]  Reason:  abdominal pain      REPORT:  EXAM:  CT ABD PELVIS WO CON    INDICATION:  abdominal pain    COMPARISON: None. CONTRAST:  None. TECHNIQUE:  Unenhanced multislice helical CT was performed from the diaphragm to the  symphysis pubis without oral or intravenous contrast administration. Contiguous 5 mm axial images were reconstructed and lung and soft tissue  windows were generated. Coronal and sagittal reformations were generated. FINDINGS:  The visualized portions of the lung bases are clear. The absence of intravenous contrast material reduces the sensitivity for  evaluation of the solid parenchymal organs of the abdomen. No focal  abnormalities are seen within the liver, spleen, pancreas or adrenal glands  or kidneys. No renal or ureteral calculus or obstruction is identified. There are multiple clips associated with the stomach. The aorta tapers without aneurysm. There is no retroperitoneal adenopathy  or mass.     The bowel is not obstructed. There is no ascites or free intraperitoneal  air. Bilateral SI sclerosis is present. An erosive component is present in the  right SI joint. There is a lateral convex mid lumbar scoliosis with  asymmetric disc space narrowing and facet arthropathy. Moderate facet  arthropathy is present in the lower lumbar spine. There is a disc space  narrowing and facet arthropathy at L4-5 and L5-S1. In addition, a few  millimeters anterolisthesis of L4 relative to L5 is noted. The uterus and ovaries are surgically absent. IMPRESSION:    No acute abdominal or pelvic process seen. Marked degenerative disc disease and osteoarthritis. Right-sided sacroiliitis        Signing/Reading Doctor: Savage Turk ([id number])  Approved: Savage Turk ([id number])  Aug 10 2013  5:10PM      MRI Results (maximum last 3): Results from Hospital Encounter encounter on 01/30/23    MRI BRAIN WO CONT    Narrative  INDICATION:   CVA    EXAMINATION:  MRI BRAIN WO CONTRAST    COMPARISON:  CT 1/29/2023    TECHNIQUE:  Multiplanar multisequence acquisition without contrast of the brain. FINDINGS:    Ventricles:  Midline, no hydrocephalus. Brain Parenchyma/Brainstem:  Patchy chronic T2 hyperintensities throughout the  supratentorial white matter. Multiple small foci of acute infarction throughout  the right middle cerebral artery territory. Intracranial Hemorrhage:  Subtle hemosiderin deposition right centrum semiovale  suggest prior microhemorrhage. No acute hemorrhage. Basal Cisterns:  Normal.  Flow Voids:  Normal.  Additional Comments:  N/A. Impression  Multiple small foci of acute infarction throughout the right middle cerebral  artery territory. Background of chronic white matter disease as above.       Lab Review  Lab Results   Component Value Date/Time    WBC 6.4 01/29/2023 08:09 PM    HCT 32.4 (L) 01/29/2023 08:09 PM    HGB 10.8 (L) 01/29/2023 08:09 PM    PLATELET 781 49/03/8176 08:09 PM     Lab Results   Component Value Date/Time    Sodium 139 01/29/2023 08:09 PM    Potassium 3.3 (L) 01/29/2023 08:09 PM    Chloride 105 01/29/2023 08:09 PM    CO2 27 01/29/2023 08:09 PM    Glucose 97 01/29/2023 08:09 PM    BUN 17 01/29/2023 08:09 PM    Creatinine 1.27 (H) 01/29/2023 08:09 PM    Calcium 9.1 01/29/2023 08:09 PM       Signed:  TASHIA Rodriguez  1/30/2023  3:44 PM    The patient was discussed with Dr. Alexa Sampson and [facility] ALEXANDRA Carroll.

## 2023-01-30 NOTE — ED NOTES
Spoke with Dr. Cruz Evans, he deferred Code Stroke but did want SOC called.  Called and spoke with Anum Davis

## 2023-01-30 NOTE — H&P
[address]  HISTORY AND PHYSICAL    Name:  Anand Cedillo  MR#:  [id number]  :  1950  ACCOUNT #:  [de-identified]  ADMIT DATE:  2023      The patient was seen, evaluated, and admitted by me on 2023. PRIMARY CARE PHYSICIAN: Pamela Garg MD    SOURCE OF INFORMATION:  The patient and review of ED electronic medical record. CHIEF COMPLAINT:  Slurred speech and left arm weakness. HISTORY OF PRESENT ILLNESS:  This 79-year-old woman with past medical history significant for hypertension, anxiety/depression, dyslipidemia, restless legs syndrome presented at the outside facility emergency room with slurred speech and left arm weakness. These symptoms lasted for approximately 30 minutes and occurred one hour before coming to the emergency room. When the patient arrived at the emergency room, code stroke was activated. The CT scan of the head without contrast was obtained. This did not show acute pathology. The CTA of the head and neck was then obtained which shows severe right internal carotid artery stenosis. The patient was transferred to [facility] for further evaluation. The patient has no record of prior admission to this hospital.  The patient denies history of prior stroke. PAST MEDICAL HISTORY:  Hypertension, anxiety/depression, dyslipidemia, and restless legs syndrome. ALLERGIES:  THE PATIENT IS ALLERGIC TO MORPHINE AND CONTRAST MEDIA. MEDICATIONS:  1. Abilify 15 mg daily. 2.  Wellbutrin  mg daily. 3.  Sinemet 25/100, 1 tablet daily. 4.  Hygroton 25 mg daily. 5.  Plavix 75 mg daily. 6.  Lexapro 20 mg daily. 7.  Nexium 20 mg daily. 8.  Ferrous sulfate 325 mg 1 tablet daily. 9.  Flonase 50 mcg 2 sprays in to each nostril daily. 10.  Neurontin, dosage as directed. 11.  Lopressor 25 mg twice daily. 12.  Pravachol 20 mg daily. 13.  Tramadol 50 mg every 6 hours as needed for pain. FAMILY HISTORY:  This was reviewed. Her mother and father had hypertension. PAST SURGICAL HISTORY:  This is significant for cholecystectomy and hysterectomy. SOCIAL HISTORY:  No history of alcohol or tobacco abuse. REVIEW OF SYSTEMS:  HEAD, EYES, EARS, NOSE AND THROAT:  No headache, no dizziness, no blurring of vision, and no photophobia. RESPIRATORY SYSTEM. No cough, no shortness of breath, and no hemoptysis. CARDIOVASCULAR SYSTEM:  No chest pain, no orthopnea, and no palpitation. GASTROINTESTINAL SYSTEM:  No nausea or vomiting. No diarrhea and no constipation. GENITOURINARY SYSTEM:  No dysuria, no urgency and no frequency. All other systems are reviewed and they are negative. PHYSICAL EXAMINATION:  GENERAL APPEARANCE:  The patient appeared ill and in moderate distress. VITAL SIGNS:  On arrival at the emergency room; temperature 98.3, pulse 65, respiratory rate 13, blood pressure 187/71, and oxygen saturation 97%. HEAD:  Normocephalic, atraumatic. EYES:  Normal eye movement. No redness, no drainage, and no discharge. EARS:  Normal external ears with no obvious drainage. NOSE:  No deformity and no drainage. MOUTH AND THROAT:  No visible oral lesion. NECK:  Neck is supple. No JVD and no thyromegaly. CHEST:  Clear breath sounds. No wheezing and no crackles. HEART:  Normal S1 and S2, regular. No clinically appreciable murmur. ABDOMEN:  Soft and nontender. Normal bowel sounds. CNS:  Alert and oriented x3. No gross focal neurological deficit. EXTREMITIES:  No edema. Pulses 2+ bilaterally. MUSCULOSKELETAL SYSTEM:  No obvious joint deformity and swelling. SKIN:  No active skin lesions seen in the exposed part of the body. PSYCHIATRY:  Normal mood and affect. LYMPHATIC SYSTEM:  No cervical lymphadenopathy. DIAGNOSTIC DATA:  The CT scan of the head without contrast negative. The CTA of the head and neck shows severe right internal carotid artery stenosis. No intracranial large vessel occlusion.     LABORATORY DATA:  Hematology; WBC 6.4, hemoglobin 10.8, hematocrit 32.4, and platelets 304. Urinalysis is significant for small blood, positive nitrite, large leukocyte esterase. Serum alcohol level less than 10. Chemistry; sodium 139, potassium 3.3, chloride 105, CO2 is 27, glucose 97, BUN 17, creatinine 1.27, calcium 9.1, total bilirubin 0.3, AST 23, ALT 24, alkaline phosphatase 57, total protein 6.8, albumin level 3.5, and globulin 3.3. Cardiac profile, troponin high-sensitivity 12. ASSESSMENT:  1. Suspected acute cerebrovascular accident. 2.  Hypertension. 3.  Anxiety/depression. 4.  Dyslipidemia. 5.  Restless legs syndrome. 6.  Right internal carotid artery stenosis. 7.  Acute cystitis with hematuria. 8.  Acute kidney injury. 9.  Anemia. 10.  Hypokalemia. PLAN:  1. Suspected acute CVA. We will admit the patient for further evaluation and treatment. We will obtain MRI of the brain. We will check lipid profile. We will check hemoglobin A1c level. Inpatient Neurology consult will be requested to assist in further evaluation and treatment. We will check D43 and folic acid level. We will also check C-reactive protein level to evaluate the patient for systemic inflammation. 2.  Hypertension. We will hold antihypertensive medication to allow for permissive hypertension, especially given the fact that the patient has right internal carotid artery stenosis. 3.  Anxiety/depression. We will resume preadmission medication. 4.  Dyslipidemia. We will continue with Pravachol. We will check lipid profile. 5.  Restless legs syndrome. The patient stated that she takes Sinemet for the restless legs syndrome. We will continue with Sinemet. 6.  Right internal carotid artery stenosis. Neurointerventional Surgery consult will be requested to assist in further evaluation and treatment. 7.  Acute cystitis with hematuria. We will start the patient on Rocephin. We will await urine culture.   8.  Acute kidney injury. This is most likely due to volume depletion. We will carry out fluid therapy and repeat the patient's renal function. 9.  Anemia. This is most likely due to chronic disease. We will carry out anemia workup including checking stool guaiac to rule out occult GI bleed. 10.  Hypokalemia. We will replete potassium and repeat potassium level. We will also check magnesium level. 10.  Other issues. Code status, the patient is a full code. We will place the patient on Lovenox for DVT prophylaxis. FUNCTIONAL STATUS PRIOR TO ADMISSION:  The patient came from home. The patient is ambulatory with no assistive device. COVID PRECAUTION:  The patient was wearing a face mask. I was wearing a face mask and gloves for this patient's encounter.       Roel Jones MD      [id number]  D:  01/30/2023 6:35  T:  01/30/2023 8:04  JOB #:  [id number]  CC:  Pamela Garg MD

## 2023-01-30 NOTE — PROGRESS NOTES
SPEECH PATHOLOGY BEDSIDE SWALLOW EVALUATION/DISCHARGE  Patient: Rhina Morrison (89 y.o. female)  Date: 1/30/2023  Primary Diagnosis: Acute CVA (cerebrovascular accident) [facility]) [I63.9]       Precautions: NA    ASSESSMENT :  Patient presents with grossly functional oropharyngeal swallow. No overt oral or pharyngeal deficits appreciated at bedside. Patient wears upper dentures for PO intake. Recommend regular texture diet with thin liquids. Patient denies changes in speech, swallow, or cognition since hospitalization. Skilled acute therapy provided by a speech-language pathologist is not indicated at this time. PLAN :  Recommendations:  Regular texture diet with thin liquids  Oral medications as tolerated  Discharge Recommendations: None from SLP perspective     SUBJECTIVE:   Patient stated I just had a tossed salad.     OBJECTIVE:     Past Medical History:   Diagnosis Date    Depression     HTN (hypertension)      Past Surgical History:   Procedure Laterality Date    HX CHOLECYSTECTOMY      HX GI      HX GYN      HX ORTHOPAEDIC      NH UNLISTED PROCEDURE CARDIAC SURGERY       Diet prior to admission: Regular/thin  Current Diet: Regular/thin    Cognitive and Communication Status:  Neurologic State: Alert  Orientation Level: Oriented X4  Cognition: Appropriate for age attention/concentration, Follows commands  Perception: Appears intact  Perseveration: No perseveration noted  Safety/Judgement: Insight into deficits    Oral Assessment:  Oral Assessment  Labial: No impairment  Dentition: Edentulous; Upper dentures  Oral Hygiene: Moist oral mucosa  Lingual: No impairment  Velum: No impairment  Mandible: No impairment    P.O. Trials:  Patient Position: Upright in stretcher  Vocal quality prior to P.O.: No impairment  Consistency Presented: Thin liquid; Solid  How Presented: Self-fed/presented;Straw  Bolus Acceptance: No impairment  Bolus Formation/Control: No impairment  Propulsion: No impairment  Oral Residue: None  Initiation of Swallow: No impairment  Laryngeal Elevation: Functional  Aspiration Signs/Symptoms: None  Pharyngeal Phase Characteristics: No impairment, issues, or problems       NOMS:   The NOMS functional outcome measure was used to quantify this patient's level of swallowing impairment. Based on the NOMS, the patient was determined to be at level 7 for swallow function:   NOMS Swallowing Levels:  Level 1 (CN): NPO  Level 2 (CM): NPO but takes consistency in therapy  Level 3 (CL): Takes less than 50% of nutrition p.o. and continues with nonoral feedings; and/or safe with mod cues; and/or max diet restriction  Level 4 (CK): Safe swallow but needs mod cues; and/or mod diet restriction; and/or still requires some nonoral feeding/supplements  Level 5 (CJ): Safe swallow with min diet restriction; and/or needs min cues  Level 6 (CI): Independent with p.o.; rare cues; usually self cues; may need to avoid some foods or needs extra time  Level 7 ([address] [place] [address]): Independent for all p.o.  SAMANTA. (2003). National Outcomes Measurement System (NOMS): Adult Speech-Language Pathology User's Guide. After treatment:   Patient left in no apparent distress in bed and Call bell within reach    COMMUNICATION/EDUCATION:   The patient's plan of care including recommendations, planned interventions, and recommended diet changes were discussed with: Patient who verbalized understanding.      Thank you for this referral.  JASMYNE Santillan  Time Calculation: 10 mins

## 2023-01-30 NOTE — ED TRIAGE NOTES
Pt reports had slurred speech and confusion 1 hr ago. Lasted 10-15 min. Resolved. Pt reports generalized weakness. Denies unilateral weakness or numbness tingling. Reports shortness of breath. Denies chest pain. No facial droop noted. Gait slow but steady. Gcs 15. Speech clear.  equal and strong.

## 2023-01-30 NOTE — PROGRESS NOTES
OCCUPATIONAL THERAPY EVALUATION/DISCHARGE  Patient: Preeti Schmidt (6 y.o. female)  Date: 1/30/2023  Primary Diagnosis: Acute CVA (cerebrovascular accident) [facility]) [I63.9]       Precautions:  (standard)    ASSESSMENT  Based on the objective data described below, the patient presents at an overall supervision level with ADLs and functional mobility following admission for possible CVA. CT of head negative for acute process and pt awaiting MRI. Pt initially with mild LOBs and able to self correct. She states this happens every morning when she first wakes up. Pt confirms she is at her functional baseline and all symptoms of slurred speech. Current Level of Function (ADLs/self-care): supervision    Functional Outcome Measure: The patient scored Total A-D  Total A-D (Motor Function): 66/66 on the Fugl-Krishna Assessment which is indicative of no impairment in upper extremity functional status. Other factors to consider for discharge: see above     PLAN :  Recommendation for discharge: (in order for the patient to meet his/her long term goals)  No skilled occupational therapy/ follow up rehabilitation needs identified at this time. This discharge recommendation:  Has not yet been discussed the attending provider and/or case management    IF patient discharges home will need the following DME: none for OT       SUBJECTIVE:   Patient stated I am ok. They say I have a UTI too.     OBJECTIVE DATA SUMMARY:   HISTORY:   Past Medical History:   Diagnosis Date    Depression     HTN (hypertension)      Past Surgical History:   Procedure Laterality Date    HX CHOLECYSTECTOMY      HX GI      HX GYN      HX ORTHOPAEDIC      CT UNLISTED PROCEDURE CARDIAC SURGERY         Prior Level of Function/Environment/Context: Independent, active, drives, cares for grandchildren  Expanded or extensive additional review of patient history:     Home Situation  Home Environment: Private residence  # Steps to Enter: 3  Rails to Enter: Yes  Hand Rails : Bilateral  One/Two Story Residence: One story  Living Alone: No  Support Systems: Spouse/Significant Other, Child(lay) (; daughter lives close)  Patient Expects to be Discharged to[de-identified] Home  Current DME Used/Available at Home: Walker, rolling (RW from back sx years ago. She does not currenlty use.)  Tub or Shower Type: Tub/Shower combination    Hand dominance: Right    EXAMINATION OF PERFORMANCE DEFICITS:  Cognitive/Behavioral Status:  Neurologic State: Alert; Appropriate for age  Orientation Level: Oriented X4  Cognition: Appropriate decision making; Appropriate for age attention/concentration; Appropriate safety awareness; Follows commands  Perception: Appears intact  Perseveration: No perseveration noted  Safety/Judgement: Awareness of environment;Driving appropriateness; Fall prevention;Good awareness of safety precautions; Home safety; Insight into deficits    Hearing: Auditory  Auditory Impairment: None    Vision/Perceptual:    Tracking: Able to track stimulus in all quadrants w/o difficulty    Saccades: Within Defined Limits    Convergence: Normal (within 6 inches from nose)       Diplopia: No    Acuity: Within Defined Limits    Corrective Lenses: Glasses    Range of Motion:  AROM: Generally decreased, functional (B shoulder imp- h/o B RTC repairs)  PROM: Generally decreased, functional (B shoulder imp- h/o B RTC repairs)                      Strength:  Strength: Generally decreased, functional (RUE 5/5, L UE 4/5)                Coordination:  Coordination: Generally decreased, functional  Fine Motor Skills-Upper: Left Intact; Right Intact    Gross Motor Skills-Upper: Left Intact; Right Intact    Tone & Sensation:  Tone: Normal  Sensation: Intact                      Balance:  Sitting: Intact  Standing: Intact; With support    Functional Mobility and Transfers for ADLs:  Bed Mobility:  Rolling: Independent  Supine to Sit: Independent  Sit to Supine: Independent  Scooting: Independent    Transfers:  Sit to Stand: Supervision (mild LOB but able to self correct)  Stand to Sit: Supervision  Bed to Chair: Supervision  Bathroom Mobility: Stand-by assistance  Toilet Transfer : Supervision    ADL Assessment:  Feeding: Independent    Oral Facial Hygiene/Grooming: Supervision (standing at sink)    Bathing: Supervision; Additional time    Upper Body Dressing: Independent    Lower Body Dressing: Supervision;Setup (crossed leg technique to reach feet)    Toileting: Supervision                ADL Intervention and task modifications:  Patient instructed and indicated understanding energy conservation techniques to increase independence and safety during ADLs. Cognitive Retraining  Safety/Judgement: Awareness of environment;Driving appropriateness; Fall prevention;Good awareness of safety precautions; Home safety; Insight into deficits      Functional Measure:  Fugl-Krishna Assessment of Motor Recovery after Stroke: BUEs  Upper Extremity Assessment: Yes    Reflex Activity  Flexors/Biceps/Fingers: Can be elicited  Extensors/Triceps: Can be elicited  Reflex Subtotal: 4    Volitional Movement Within Synergies  Shoulder Retraction: Full  Shoulder Elevation: Full  Shoulder Abduction (90 degrees): Full  Shoulder External Rotation: Full  Elbow Flexion: Full  Forearm Supination: Full  Shoulder Adduction/Internal Rotation: Full  Elbow Extension: Full  Forearm Pronation: Full  Subtotal: 18    Volitional Movement Mixing Synergies  Hand to Lumbar Spine: Full  Shoulder Flexion (0-90 degrees): Full  Pronation-Supination: Full  Subtotal: 6    Volitional Movement With Little or No Synergy  Shoulder Abduction (0-90 degrees): Full  Shoulder Flexion ( degrees): Full  Pronation/Supination: Full  Subtotal : 6    Normal Reflex Activity  Biceps, Triceps, Finger Flexors:  Full  Subtotal : 2    Upper Extremity Total   Upper Extremity Total: 36    Wrist  Stability at 15 Degree Dorsiflexion: Full  Repeated Dorsiflexion/ Volar Flexion: Full  Stability at 15 Degree Dorsiflexion: Full  Repeated Dorsiflexion/ Volar Flexion: Full  Circumduction: Full  Wrist Total: 10    Hand  Mass Flexion: Full  Mass Extension: Full  Grasp A: Full  Grasp B: Full  Grasp C: Full  Grasp D: Full  Grasp E: Full  Hand Total: 14    Coordination/Speed  Tremor: None  Dysmetria: None  Time: <1s (RUE 4 sec and LUE 5 sec)  Coordination/Speed Total : 6    Total A-D  Total A-D (Motor Function): 66/66     This is a reliable/valid measure of arm function after a neurological event. It has established value to characterize functional status and for measuring spontaneous and therapy-induced recovery; tests proximal and distal motor functions. Fugl-Krishna Assessment - UE scores recorded between five and 30 days post neurologic event can be used to predict UE recovery at six months post neurologic event. Severe = 0-21 points   Moderately Severe = 22-33 points   Moderate = 34-47 points   Mild = 48-66 points  MIGUEL ÁNGEL Lovell, NEERU Olson, & TAWANA Ingram (1992). Measurement of motor recovery after stroke: Outcome assessment and sample size requirements.  Stroke, 23, pp. 6276-6496.   ------------------------------------------------------------------------------------------------------------------------------------------------------------------  MCID:  Stroke:   Lali Epstein al, 2001; n = 171; mean age 79 (6) years; assessed within 16 (12) days of stroke, Acute Stroke)  FMA Motor Scores from Admission to Discharge   10 point increase in FMA Upper Extremity = 1.5 change in discharge FIM   10 point increase in FMA Lower Extremity = 1.9 change in discharge FIM  MDC:   Stroke:   José Manuel Schneider et al, 2008, n = 14, mean age = 59.9 (14.6) years, assessed on average 14 (6.5) months post stroke, Chronic Stroke)   FMA = 5.2 points for the Upper Extremity portion of the assessment     Occupational Therapy Evaluation Charge Determination   History Examination Decision-Making LOW Complexity : Brief history review  LOW Complexity : 1-3 performance deficits relating to physical, cognitive , or psychosocial skils that result in activity limitations and / or participation restrictions  LOW Complexity : No comorbidities that affect functional and no verbal or physical assistance needed to complete eval tasks       Based on the above components, the patient evaluation is determined to be of the following complexity level: LOW   Pain Rating:  No c/o pain    Activity Tolerance:   Good    After treatment patient left in no apparent distress:    Supine in bed and Call bell within reach    COMMUNICATION/EDUCATION:   The patients plan of care was discussed with: Physical therapist and Registered nurse. Patient was educated regarding her deficit(s) of resolved slurred speech as this relates to her diagnosis of possible CVA. She demonstrated Good understanding as evidenced by awareness of situation. Patient and/or family was verbally educated on the BE FAST acronym for signs/symptoms of CVA and TIA. BE FAST was written on patient's communication board  for visual education and reinforcement. All questions answered with patient indicating good understanding.      Thank you for this referral.  London Mckeon OT  Time Calculation: 21 mins

## 2023-01-30 NOTE — CONSULTS
Neurointerventional Surgery Consult  [facility]  Neurocritical Care NP    Patient: Osmar Jeff MRN: [id number]  SSN: [id number]    YOB: 1950  Age: 67 y.o. Sex: female        Chief Complaint: transient episode of speech difficulty and left arm weakness     Subjective:      Osmar Jeff is a 67 y.o. female  with a PMH significant for depression, cardiac stent on Plavix, HTN, and hyperlipidemia who initially presented to [facility] ED on 1/29/2023 with a transient episode of speech difficulty at home. Symptoms started around 1830 pm yesterday evening. Her symptoms lasted for about 15 mins. She reported she then went to the ED and her speech difficulty came back and she also had associated left arm weakness. Her symptoms lasted for about 30-45 mins. She has no prior history of a stroke. She takes 75 mg of Plavix daily for history of a cardiac stent. CT of Head showed no acute infarct or hemorrhage. Periventricular white matter disease is likely secondary to chronic small vessel ischemic changes. CTA of head and neck showed a severe right ICA stenosis and no LVO. NIS was consulted for evaluation and recommended transfer to [facility] for Neurointervention. She was started on 81 mg of aspirin daily and home dose Plavix 75 mg daily was restarted. She reports she is compliant with her Plavix. She reports no recurrence of stroke-like symptoms. She denies any headache, fever, chills, chest pain, SOB, heart palpitations, dizziness, vision changes, numbness, tingling, weakness, nausea, vomiting, or coordination issues. She does report issues with balance with her symptoms. She walks without any assistive device normally.      Past Medical History:   Diagnosis Date    Depression     HTN (hypertension)    Hyperlipidemia   Past Surgical History:   Procedure Laterality Date    HX CHOLECYSTECTOMY      HX GI      HX GYN      HX ORTHOPAEDIC      MS UNLISTED PROCEDURE CARDIAC SURGERY     Hx of cardiac stent  Bilateral shoulder surgery for rotator cuff repair  Left elbow fracture repair   Gastric bypass in 1996  Bladder tuck surgery  Hysterectomy  Bilateral cataract surgery    Family History: no family history of strokes or vascular issues  Father: passed away from an MI  Social History     Tobacco Use    Smoking status: Never    Smokeless tobacco: Never   Substance Use Topics    Alcohol use: Never      Current Facility-Administered Medications   Medication Dose Route Frequency Provider Last Rate Last Admin    ARIPiprazole (ABILIFY) tablet 15 mg  15 mg Oral DAILY Matilde Abarca MD   15 mg at 01/30/23 3135    buPROPion SR (WELLBUTRIN SR) tablet 150 mg  150 mg Oral QHS Matilde Abarca MD        carbidopa-levodopa (SINEMET)  mg per tablet 1 Tablet  1 Tablet Oral DAILY Matilde Abarca MD   1 Tablet at 01/30/23 0834    clopidogreL (PLAVIX) tablet 75 mg  75 mg Oral DAILY Matilde Abarca MD   75 mg at 01/30/23 0834    escitalopram oxalate (LEXAPRO) tablet 20 mg  20 mg Oral DAILY Matilde Abarca MD   20 mg at 01/30/23 0834    pantoprazole (PROTONIX) tablet 40 mg  40 mg Oral ACB Matilde Abarca MD   40 mg at 01/30/23 0834    pravastatin (PRAVACHOL) tablet 20 mg  20 mg Oral QHS Matilde Abarca MD        acetaminophen (TYLENOL) tablet 650 mg  650 mg Oral Q4H PRN Matilde Abarca MD        Or    acetaminophen (TYLENOL) solution 650 mg  650 mg Per NG tube Q4H PRN Matilde Abarca MD        Or    acetaminophen (TYLENOL) suppository 650 mg  650 mg Rectal Q4H PRN Matilde Abarca MD        ondansetron (ZOFRAN) injection 4 mg  4 mg IntraVENous Q6H PRN Matilde Abarca MD        aspirin chewable tablet 81 mg  81 mg Oral DAILY Matilde Abarca MD   81 mg at 01/30/23 0834    bisacodyL (DULCOLAX) tablet 5 mg  5 mg Oral DAILY PRN Matilde Abarca MD        enoxaparin (LOVENOX) injection 40 mg  40 mg SubCUTAneous Q24H Matilde Abarca MD   40 mg at 01/30/23 0706    cefTRIAXone (ROCEPHIN) 1 g in 0.9% sodium chloride 10 mL IV syringe  1 g IntraVENous Q24H Matilde Abarca MD        lactated Ringers infusion  75 mL/hr IntraVENous CONTINUOUS Matilde Abarca MD 75 mL/hr at 01/30/23 0706 75 mL/hr at 01/30/23 0706    buPROPion SR (WELLBUTRIN SR) tablet 300 mg  300 mg Oral DAILY Matilde Abarca MD         Current Outpatient Medications   Medication Sig Dispense Refill    buPROPion SR (WELLBUTRIN SR) 150 mg SR tablet Take 150 mg by mouth nightly. clopidogreL (PLAVIX) 75 mg tab       metoprolol tartrate (LOPRESSOR) 25 mg tablet       chlorthalidone (HYGROTON) 25 mg tablet       ARIPiprazole (ABILIFY) 15 mg tablet       buPROPion SR (WELLBUTRIN SR) 150 mg SR tablet Take 300 mg by mouth daily. carbidopa-levodopa (SINEMET)  mg per tablet       ascorbic acid, vitamin C, (Vitamin C) 500 mg tablet as directed. turmeric root extract 500 mg cap 1 tablet      vitamins a & d cap 1 capsule      cyclobenzaprine (FLEXERIL) 10 mg tablet       diclofenac (VOLTAREN) 1 % gel apply 4 grams to affected area as needed      ergocalciferol (ERGOCALCIFEROL) 1,250 mcg (50,000 unit) capsule 1 capsule      escitalopram oxalate (LEXAPRO) 20 mg tablet       esomeprazole (NEXIUM) 40 mg capsule       ferrous sulfate 325 mg (65 mg iron) tablet 1 tablet      fexofenadine (ALLEGRA) 180 mg tablet 1 tablet      fluticasone propionate (Flonase Allergy Relief) 50 mcg/actuation nasal spray 2 sprays each nostril      gabapentin (NEURONTIN) 600 mg tablet 1 capsule      pravastatin (PRAVACHOL) 20 mg tablet       prochlorperazine (COMPAZINE) 5 mg tablet 1-2 tabs po every 6 hours prn nausea 40 Tab 0    traMADol (ULTRAM) 50 mg tablet Take 1 Tab by mouth every six (6) hours as needed for Pain.  (Patient taking differently: Take 50 mg by mouth three (3) times daily.) 30 Tab 0        Allergies   Allergen Reactions    Iodinated Contrast Media Hives     Okay with benedryl     Morphine Unknown (comments)       Review of Systems:  Pertinent items are noted in the History of Present Illness. Objective:     Vitals:    01/30/23 0726 01/30/23 0807 01/30/23 0901 01/30/23 0906   BP: (!) 149/74 (!) 151/69 (!) 163/73 (!) 163/73   Pulse: 69 70 70    Resp: 15 15     Temp:  98.3 °F (36.8 °C)     SpO2:  98%     Weight:    169 lb (76.7 kg)   Height:    5' 5\" (1.651 m)        Physical Exam:  GENERAL: alert, cooperative, no distress, appears stated age  EYE: conjunctivae/corneas clear. PERRL, EOM's intact. NECK: no carotid bruits   LUNG: clear to auscultation bilaterally  HEART: regular rate and rhythm, S1, S2 normal, no murmur, click, rub or gallop  EXTREMITIES:  extremities normal, atraumatic, no cyanosis or edema  SKIN: Skin warm to touch. Appropriate for ethnicity. Neurologic Exam:  Mental Status:  Alert and oriented x 4. Appropriate affect, mood and behavior. Language:    Normal fluency, repetition, comprehension and naming. Cranial Nerves:   . Pupils equal, round and reactive to light. Visual fields full to confrontation. Extraocular movements intact. Facial sensation intact. Full facial strength, no asymmetry. No dysarthria. Tongue protrudes to midline, palate elevates symmetrically. Shoulder shrug 5/5 bilaterally. Motor:    No pronator drift. Bulk and tone normal.      5/5 power in all extremities proximally and distally. No involuntary movements. Sensation:    Sensation intact throughout to light touch. No neglect. Coordination & Gait: No ataxia with FTN and HTS bilaterally. Gait deferred.       Recent Results (from the past 24 hour(s))   GLUCOSE, POC    Collection Time: 01/29/23  7:34 PM   Result Value Ref Range    Glucose (POC) 107 (H) 65 - 100 mg/dL    Performed by Carole Rocheavel    CBC WITH AUTOMATED DIFF    Collection Time: 01/29/23  8:09 PM   Result Value Ref Range    WBC 6.4 3.6 - 11.0 K/uL    RBC 3.55 (L) 3.80 - 5.20 M/uL    HGB 10.8 (L) 11.5 - 16.0 g/dL    HCT 32.4 (L) 35.0 - 47.0 % MCV 91.3 80.0 - 99.0 FL    MCH 30.4 26.0 - 34.0 PG    MCHC 33.3 30.0 - 36.5 g/dL    RDW 14.2 11.5 - 14.5 %    PLATELET 843 762 - 364 K/uL    MPV 9.1 8.9 - 12.9 FL    NRBC 0.0 0.0  WBC    ABSOLUTE NRBC 0.00 0.00 - 0.01 K/uL    NEUTROPHILS 71 32 - 75 %    LYMPHOCYTES 14 12 - 49 %    MONOCYTES 9 5 - 13 %    EOSINOPHILS 4 0 - 7 %    BASOPHILS 1 0 - 1 %    IMMATURE GRANULOCYTES 1 (H) 0 - 0.5 %    ABS. NEUTROPHILS 4.6 1.8 - 8.0 K/UL    ABS. LYMPHOCYTES 0.9 0.8 - 3.5 K/UL    ABS. MONOCYTES 0.5 0.0 - 1.0 K/UL    ABS. EOSINOPHILS 0.3 0.0 - 0.4 K/UL    ABS. BASOPHILS 0.1 0.0 - 0.1 K/UL    ABS. IMM. GRANS. 0.1 (H) 0.00 - 0.04 K/UL    DF AUTOMATED     PROTHROMBIN TIME + INR    Collection Time: 01/29/23  8:09 PM   Result Value Ref Range    Prothrombin time 14.2 11.9 - 14.6 sec    INR 1.1 0.9 - 1.1     METABOLIC PANEL, COMPREHENSIVE    Collection Time: 01/29/23  8:09 PM   Result Value Ref Range    Sodium 139 136 - 145 mmol/L    Potassium 3.3 (L) 3.5 - 5.1 mmol/L    Chloride 105 97 - 108 mmol/L    CO2 27 21 - 32 mmol/L    Anion gap 7 5 - 15 mmol/L    Glucose 97 65 - 100 mg/dL    BUN 17 6 - 20 mg/dL    Creatinine 1.27 (H) 0.55 - 1.02 mg/dL    BUN/Creatinine ratio 13 12 - 20      eGFR 45 (L) >60 ml/min/1.73m2    Calcium 9.1 8.5 - 10.1 mg/dL    Bilirubin, total 0.3 0.2 - 1.0 mg/dL    AST (SGOT) 23 15 - 37 U/L    ALT (SGPT) 24 12 - 78 U/L    Alk.  phosphatase 57 45 - 117 U/L    Protein, total 6.8 6.4 - 8.2 g/dL    Albumin 3.5 3.5 - 5.0 g/dL    Globulin 3.3 2.0 - 4.0 g/dL    A-G Ratio 1.1 1.1 - 2.2     URINALYSIS W/ RFLX MICROSCOPIC    Collection Time: 01/29/23  8:09 PM   Result Value Ref Range    Color Yellow/Straw      Appearance Hazy (A) Clear      Specific gravity 1.010 1.003 - 1.030      pH (UA) 5.0      Protein Negative Negative mg/dL    Glucose Negative Negative mg/dL    Ketone Negative Negative mg/dL    Bilirubin Negative Negative      Blood Small (A) Negative      Urobilinogen 0.1 (L) 0.2 - 1.0 EU/dL    Nitrites Positive (A) Negative      Leukocyte Esterase Large (A) Negative     TROPONIN-HIGH SENSITIVITY    Collection Time: 01/29/23  8:09 PM   Result Value Ref Range    Troponin-High Sensitivity 12 0 - 51 ng/L   ETHYL ALCOHOL    Collection Time: 01/29/23  8:09 PM   Result Value Ref Range    ALCOHOL(ETHYL),SERUM <10 <10 mg/dL   URINE MICROSCOPIC    Collection Time: 01/29/23  8:09 PM   Result Value Ref Range    WBC >100 (H) 0 - 4 /hpf    RBC 10-20 0 - 5 /hpf    Epithelial cells Many (A) Few /lpf    Bacteria 1+ (A) Negative /hpf    Mucus Trace (A) Negative /lpf   SAMPLES BEING HELD    Collection Time: 01/30/23  4:09 AM   Result Value Ref Range    SAMPLES BEING HELD 1PST,1RED,1BLUE,1LAV     COMMENT        Add-on orders for these samples will be processed based on acceptable specimen integrity and analyte stability, which may vary by analyte.    C REACTIVE PROTEIN, QT    Collection Time: 01/30/23  4:09 AM   Result Value Ref Range    C-Reactive protein 1.24 (H) 0.00 - 0.60 mg/dL   LIPID PANEL    Collection Time: 01/30/23  4:09 AM   Result Value Ref Range    Cholesterol, total 191 <200 MG/DL    Triglyceride 238 (H) <150 MG/DL    HDL Cholesterol 38 MG/DL    LDL, calculated 105.4 (H) 0 - 100 MG/DL    VLDL, calculated 47.6 MG/DL    CHOL/HDL Ratio 5.0 0.0 - 5.0     MAGNESIUM    Collection Time: 01/30/23  4:09 AM   Result Value Ref Range    Magnesium 2.3 1.6 - 2.4 mg/dL   NT-PRO BNP    Collection Time: 01/30/23  4:09 AM   Result Value Ref Range    NT pro- (H) <125 PG/ML   PHOSPHORUS    Collection Time: 01/30/23  4:09 AM   Result Value Ref Range    Phosphorus 3.9 2.6 - 4.7 MG/DL   TROPONIN-HIGH SENSITIVITY    Collection Time: 01/30/23  4:09 AM   Result Value Ref Range    Troponin-High Sensitivity 14 0 - 51 ng/L   IRON PROFILE    Collection Time: 01/30/23  4:09 AM   Result Value Ref Range    Iron 52 35 - 150 ug/dL    TIBC 334 250 - 450 ug/dL    Iron % saturation 16 (L) 20 - 50 %   FOLATE    Collection Time: 01/30/23  6:30 AM Result Value Ref Range    Folate 11.5 5.0 - 21.0 ng/mL   HEMOGLOBIN A1C WITH EAG    Collection Time: 01/30/23  6:30 AM   Result Value Ref Range    Hemoglobin A1c 5.4 4.0 - 5.6 %    Est. average glucose 108 mg/dL   TSH 3RD GENERATION    Collection Time: 01/30/23  6:30 AM   Result Value Ref Range    TSH 4.62 (H) 0.36 - 3.74 uIU/mL   VITAMIN B12    Collection Time: 01/30/23  6:30 AM   Result Value Ref Range    Vitamin B12 311 193 - 986 pg/mL   FERRITIN    Collection Time: 01/30/23  6:30 AM   Result Value Ref Range    Ferritin 38 26 - 388 NG/ML   DRUG SCREEN, URINE    Collection Time: 01/30/23  6:41 AM   Result Value Ref Range    AMPHETAMINES Negative NEG      BARBITURATES Negative NEG      BENZODIAZEPINES Negative NEG      COCAINE Negative NEG      METHADONE Negative NEG      OPIATES Negative NEG      PCP(PHENCYCLIDINE) Negative NEG      THC (TH-CANNABINOL) Negative NEG      Drug screen comment (NOTE)    URINE CULTURE HOLD SAMPLE    Collection Time: 01/30/23  6:41 AM    Specimen: Urine   Result Value Ref Range    Urine culture hold        Urine on hold in Microbiology dept for 2 days. If unpreserved urine is submitted, it cannot be used for addtional testing after 24 hours, recollection will be required.    ECHO ADULT COMPLETE    Collection Time: 01/30/23  9:24 AM   Result Value Ref Range    IVSd 1.2 (A) 0.6 - 0.9 cm    LVIDd 4.2 3.9 - 5.3 cm    LVIDs 2.6 cm    LVOT Diameter 2.0 cm    LVPWd 1.1 (A) 0.6 - 0.9 cm    LVOT Peak Gradient 6 mmHg    LVOT Peak Velocity 1.2 m/s    RVIDd 3.7 cm    RV Free Wall Peak S' 11 cm/s    LA Diameter 4.0 cm    LA Volume A/L 69 mL    LA Volume 2C 69 (A) 22 - 52 mL    LA Volume 4C 61 (A) 22 - 52 mL    AV Area by Peak Velocity 2.4 cm2    AV Peak Gradient 10 mmHg    AV Peak Velocity 1.6 m/s    MV A Velocity 0.99 m/s    MV E Wave Deceleration Time 325.9 ms    MV E Velocity 0.73 m/s    LV E' Lateral Velocity 8 cm/s    LV E' Septal Velocity 6 cm/s    MV PHT 94.5 ms    MV Area by PHT 2.3 cm2 PV Peak Gradient 2 mmHg    PV Max Velocity 0.7 m/s    TAPSE 1.7 1.7 cm    Aortic Root 3.1 cm    Fractional Shortening 2D 38 28 - 44 %    LVIDd Index 2.28 cm/m2    LVIDs Index 1.41 cm/m2    LV RWT Ratio 0.52     LV Mass 2D 167.4 (A) 67 - 162 g    LV Mass 2D Index 91.0 43 - 95 g/m2    MV E/A 0.74     E/E' Ratio (Averaged) 10.65     E/E' Lateral 9.13     E/E' Septal 12.17     LA Volume Index A/L 38 16 - 34 mL/m2    LVOT Area 3.1 cm2    LA Volume Index 2C 38 (A) 16 - 34 mL/m2    LA Volume Index 4C 33 16 - 34 mL/m2    LA Size Index 2.17 cm/m2    LA/AO Root Ratio 1.29     Ao Root Index 1.68 cm/m2    AV Velocity Ratio 0.75     SILVANO/BSA Peak Velocity 1.3 cm2/m2       Imaging:  CTH: No acute infarct or hemorrhage. CTA of head and neck: severe right ICA stenosis. No LVO. MRI of Brain: Multiple small foci of acute infarctions in the right MCA territory. Subtle hemosiderin deposition right centrum semiovale suggest prior microhemorrhage. No acute hemorrhage. ECHO: EF 60-65%. Mild concentric hypertrophy. Mild mitral valve regurgitation. 1.9 x 1.4 cm moderately sized vegetation present that is mobile and irregular on the anterior leaflet.  Mild sclerosis of the aortic valve cusp  Assessment:     Acute Right MCA territory Ischemic CVA  Right carotid artery stenosis    Plan:   - ECHO shows vegetation in mitral valve, Cardiology consulted for evaluation  - patient will need to follow up outpatient with [facility] Dr. Jose Raul Johnson for severe right ICA stenosis after Cardiology further evaluates vegetation on ECHO, she will eventually need right ICA stented   - of note patient is allergic to contrast and will need to be premedicated if contrast is needed   - continue DAPT with ASA 81 mg daily and Plavix 75 mg daily, check aspirin and P2Y12 test  - LDL noted to be 105.4, goal <70, triglycerides 238, changed statin to high dose statin Lipitor 40 mg QHS for secondary stroke prevention (patient reports she tolerates statins ok)  - Hgb A1C ok at 5.4  - PT/OT/SLP evals  - Neurology following    Plan discussed with Dr. Jose Raul Johnson, Dr. Kristy Palmer, Neurology NP Selvin Mayers, and patient. Called and updated patient's sister Michelle Augustine on plan of care. Thank you for this consult and participating in the care of this patient. Addendum: 1930: Aspirin test therapeutic at 414. P2Y12 nontherapeutic at 259. Discussed with Dr. Jose Raul Johnson he would like to keep DAPT regimen the same and will follow up with her clinic within 2 weeks after discharge. Discussed with Dr. Kristy Palmer.      Signed By: Kusum Darnell NP     January 30, 2023

## 2023-01-30 NOTE — ROUTINE PROCESS
TRANSFER - OUT REPORT:    Verbal report given to Eduardo Rankin RN(name) on Lia Oakes  being transferred to [facility](unit) for routine progression of care       Report consisted of patients Situation, Background, Assessment and   Recommendations(SBAR). Information from the following report(s) SBAR, ED Summary, Procedure Summary, Intake/Output, MAR, and Recent Results was reviewed with the receiving nurse. Lines:   Peripheral IV 01/30/23 Left Forearm (Active)       Peripheral IV 01/30/23 Right Forearm (Active)        Opportunity for questions and clarification was provided.       Patient transported with:   Registered Nurse

## 2023-01-30 NOTE — ED NOTES
Patient now noted with facial droop to right side of face and some slurred speech that just begun. Dr. Twin Quiroz notified at this time.  SOC called for Code Neuro, Level 1

## 2023-01-31 LAB
ANION GAP SERPL CALC-SCNC: 7 MMOL/L (ref 5–15)
BACTERIA SPEC CULT: NORMAL
BUN SERPL-MCNC: 11 MG/DL (ref 6–20)
BUN/CREAT SERPL: 10 (ref 12–20)
CALCIUM SERPL-MCNC: 8.8 MG/DL (ref 8.5–10.1)
CHLORIDE SERPL-SCNC: 108 MMOL/L (ref 97–108)
CO2 SERPL-SCNC: 25 MMOL/L (ref 21–32)
CREAT SERPL-MCNC: 1.08 MG/DL (ref 0.55–1.02)
ERYTHROCYTE [DISTWIDTH] IN BLOOD BY AUTOMATED COUNT: 14.2 % (ref 11.5–14.5)
GLUCOSE SERPL-MCNC: 110 MG/DL (ref 65–100)
HCT VFR BLD AUTO: 34.9 % (ref 35–47)
HGB BLD-MCNC: 11.4 G/DL (ref 11.5–16)
MAGNESIUM SERPL-MCNC: 2.3 MG/DL (ref 1.6–2.4)
MCH RBC QN AUTO: 29.8 PG (ref 26–34)
MCHC RBC AUTO-ENTMCNC: 32.7 G/DL (ref 30–36.5)
MCV RBC AUTO: 91.1 FL (ref 80–99)
NRBC # BLD: 0 K/UL (ref 0–0.01)
NRBC BLD-RTO: 0 PER 100 WBC
PHOSPHATE SERPL-MCNC: 4.1 MG/DL (ref 2.6–4.7)
PLATELET # BLD AUTO: 275 K/UL (ref 150–400)
PMV BLD AUTO: 9 FL (ref 8.9–12.9)
POTASSIUM SERPL-SCNC: 3.6 MMOL/L (ref 3.5–5.1)
RBC # BLD AUTO: 3.83 M/UL (ref 3.8–5.2)
SERVICE CMNT-IMP: NORMAL
SODIUM SERPL-SCNC: 140 MMOL/L (ref 136–145)
WBC # BLD AUTO: 6.1 K/UL (ref 3.6–11)

## 2023-01-31 PROCEDURE — 83735 ASSAY OF MAGNESIUM: CPT

## 2023-01-31 PROCEDURE — 74011250637 HC RX REV CODE- 250/637: Performed by: INTERNAL MEDICINE

## 2023-01-31 PROCEDURE — 99231 SBSQ HOSP IP/OBS SF/LOW 25: CPT | Performed by: NURSE PRACTITIONER

## 2023-01-31 PROCEDURE — 36415 COLL VENOUS BLD VENIPUNCTURE: CPT

## 2023-01-31 PROCEDURE — 74011250636 HC RX REV CODE- 250/636: Performed by: INTERNAL MEDICINE

## 2023-01-31 PROCEDURE — 85027 COMPLETE CBC AUTOMATED: CPT

## 2023-01-31 PROCEDURE — 65270000046 HC RM TELEMETRY

## 2023-01-31 PROCEDURE — 74011000250 HC RX REV CODE- 250: Performed by: INTERNAL MEDICINE

## 2023-01-31 PROCEDURE — 74011250637 HC RX REV CODE- 250/637: Performed by: NURSE PRACTITIONER

## 2023-01-31 PROCEDURE — 80048 BASIC METABOLIC PNL TOTAL CA: CPT

## 2023-01-31 PROCEDURE — 84100 ASSAY OF PHOSPHORUS: CPT

## 2023-01-31 RX ADMIN — ARIPIPRAZOLE 15 MG: 10 TABLET ORAL at 09:44

## 2023-01-31 RX ADMIN — ENOXAPARIN SODIUM 40 MG: 100 INJECTION SUBCUTANEOUS at 06:31

## 2023-01-31 RX ADMIN — BUPROPION HYDROCHLORIDE 300 MG: 150 TABLET, EXTENDED RELEASE ORAL at 09:44

## 2023-01-31 RX ADMIN — ATORVASTATIN CALCIUM 40 MG: 40 TABLET, FILM COATED ORAL at 21:59

## 2023-01-31 RX ADMIN — ESCITALOPRAM OXALATE 20 MG: 10 TABLET ORAL at 09:44

## 2023-01-31 RX ADMIN — ASPIRIN 81 MG: 81 TABLET, CHEWABLE ORAL at 09:44

## 2023-01-31 RX ADMIN — BUPROPION HYDROCHLORIDE 150 MG: 150 TABLET, EXTENDED RELEASE ORAL at 21:59

## 2023-01-31 RX ADMIN — PANTOPRAZOLE SODIUM 40 MG: 40 TABLET, DELAYED RELEASE ORAL at 06:35

## 2023-01-31 RX ADMIN — CARBIDOPA AND LEVODOPA 1 TABLET: 25; 100 TABLET ORAL at 09:44

## 2023-01-31 RX ADMIN — CLOPIDOGREL BISULFATE 75 MG: 75 TABLET ORAL at 09:43

## 2023-01-31 RX ADMIN — CEFTRIAXONE SODIUM 1 G: 1 INJECTION, POWDER, FOR SOLUTION INTRAMUSCULAR; INTRAVENOUS at 21:59

## 2023-01-31 NOTE — PROGRESS NOTES
Neurocritical Care Brief Progress Note:  76-year-old female admitted with transient episode of speech difficulty. MRI showed multiple small foci of acute infarction in the right MCA territory. Patient also with R ICA stenosis. Started on aspirin and Plavix. Aspirin level therapeutic at 414, P2Y12 subtherapeutic at 259, is to stay on current dosages for now. Is to follow up with Dr. Shyann Zavaleta outpatient, plan for stent in the right ICA soon. Cardiology was consulted due to Mitral valve vegetation on ECHO      Physical Exam:  Gen: NAD, calm, cooperative  Neuro: A&Ox4. Follows commands. Speech clear. Affect normal. PERRL, 3 mm bilaterally. Blinks to threat. No disconjugate gaze present. EOMI. Face symmetric. Palate symmetric. Tongue midline. Shay spontaneously. Strength 5/5 in UE and LE BL. Negative drift. Bulk and tone normal. No involuntary movements. Gait deferred. Skin: Warm, dry, color appropriate for ethnicity. NIHSS: 0    [facility] to follow as outpatient. Neurology following.        Mike Ernst NP  Neurocritical Care Nurse Practitioner  [phone]

## 2023-01-31 NOTE — PROGRESS NOTES
Transition of Care Plan: home with OP therapy     RUR: 12% low    PCP F/U: Melody Elena MD    Disposition: home with OP therapy     Transportation: family    [id number]: CM continuing to follow for medical stability.      Raj Briscoe RN, CRM

## 2023-01-31 NOTE — PROGRESS NOTES
Bedside and Verbal shift change report given to Stacie Sloan (oncoming nurse) by Una Crook RN (offgoing nurse). Report included the following information SBAR, Kardex, Intake/Output, Cardiac Rhythm NSR, Quality Measures, and Dual Neuro Assessment.

## 2023-01-31 NOTE — PROGRESS NOTES
Neurology Progress Note     NAME: Maria Victoria Gooden   :  1950   MRN:  [id number]   DATE:  2023    Assessment:     Principal Problem:    Acute CVA (cerebrovascular accident) [facility]) (2023)    Active Problems:    Internal carotid artery stenosis, right (2023)      Patient is a 67year-old RH female with history of HTN, CAD with stent x1 (placed 13 yrs ago remained on Plavix daily) who had episode of slurred speech 23 lasting 15 mins. and then while at [facility] ED had slurred speech with LUE weakness and R facial droop lasting 5 mins with no recurrence of symptoms. CTA showed R ICA 80-90% stenosis and pt was subsequently transferred here. MRI brain yesterday showed multiple small infarcts to R MCA territory. TTE revealed EF 60-65% and showed concern for moderate sized mobile vegetation on the anterior leaflet of the mitral valve. UA (23) large LE, +Nit, >100 WBC, 1+montez  .4, HgbA1C 5.4  Plan:   1.) R MCA acute embolic strokes:  - Etiology likely R ICA severe stenosis. Plan for NIS stent outpatient   - Also found to have MV vegetation. Follow-up Cardiology consult for intervention prior to stent. Blood cultures ordered  - Continue DAPT with with aspirin 81 mg daily and Plavix 75 mg daily. Aspirin assay therapeutic (414) and P2Y12 subtherapeutic (259). Continue at current doses. - Continue atorvastatin 40 mg for goal LDL <70  - Symptoms do not seem to be pressure dependent.  Goal -180.  - PT/OT, Dispo plan for home when medically stable      Subjective:   No acute complaints    Objective:   Chart reviewed since last seen    Current Facility-Administered Medications   Medication Dose Route Frequency    ARIPiprazole (ABILIFY) tablet 15 mg  15 mg Oral DAILY    buPROPion SR (WELLBUTRIN SR) tablet 150 mg  150 mg Oral QHS carbidopa-levodopa (SINEMET)  mg per tablet 1 Tablet  1 Tablet Oral DAILY    clopidogreL (PLAVIX) tablet 75 mg  75 mg Oral DAILY    escitalopram oxalate (LEXAPRO) tablet 20 mg  20 mg Oral DAILY    pantoprazole (PROTONIX) tablet 40 mg  40 mg Oral ACB    acetaminophen (TYLENOL) tablet 650 mg  650 mg Oral Q4H PRN    Or    acetaminophen (TYLENOL) solution 650 mg  650 mg Per NG tube Q4H PRN    Or    acetaminophen (TYLENOL) suppository 650 mg  650 mg Rectal Q4H PRN    ondansetron (ZOFRAN) injection 4 mg  4 mg IntraVENous Q6H PRN    aspirin chewable tablet 81 mg  81 mg Oral DAILY    bisacodyL (DULCOLAX) tablet 5 mg  5 mg Oral DAILY PRN    enoxaparin (LOVENOX) injection 40 mg  40 mg SubCUTAneous Q24H    cefTRIAXone (ROCEPHIN) 1 g in 0.9% sodium chloride 10 mL IV syringe  1 g IntraVENous Q24H    buPROPion SR (WELLBUTRIN SR) tablet 300 mg  300 mg Oral DAILY    atorvastatin (LIPITOR) tablet 40 mg  40 mg Oral QHS    melatonin tablet 3 mg  3 mg Oral QHS PRN       Visit Vitals  BP (!) 156/79 (BP 1 Location: Left upper arm, BP Patient Position: Semi fowlers)   Pulse 76   Temp 97.9 °F (36.6 °C)   Resp (!) 31   Ht 5' 5\" (1.651 m)   Wt 76.7 kg (169 lb)   SpO2 96%   BMI 28.12 kg/m²     Temp (24hrs), Av.2 °F (36.8 °C), Min:97.9 °F (36.6 °C), Max:98.4 °F (36.9 °C)      No intake/output data recorded.  1901 -  0700  In: -   Out: 650 [Urine:650]      Physical Exam:  General: Well developed well nourished patient in no apparent distress. Cardiac: Regular rate and rhythm with no murmurs. Extremities: 2+ Radial pulses, no cyanosis or edema    Neurological Exam:  Mental Status: Oriented to time, place and person. Speech and language intact. Attention and fund of knowledge appropriate. Normal recent and remote memory. Cranial Nerves:   VFF, PERRL, EOMI, no nystagmus, no ptosis. Facial sensation is normal. Facial movement is symmetric. Palate is midline. Tongue is midline. Hearing is intact bilaterally.    Motor: 5/5 strength in upper and lower proximal and distal muscles. Normal bulk and tone. No PD. No tremors   Reflexes:   Deferred   Sensory:   SILT   Gait:  Deferred   Cerebellar:  Intact FTN and HTS, SIGIFREDO intact         Lab Review   Recent Results (from the past 24 hour(s))   ASPIRIN TEST    Collection Time: 01/30/23  3:19 PM   Result Value Ref Range    Aspirin test 414 ARU   PLATELET FUNCTION, VERIFY NOW P2Y12    Collection Time: 01/30/23  3:19 PM   Result Value Ref Range    P2Y12 Plt response 259 194 - 418 PRU   URINE CULTURE HOLD SAMPLE    Collection Time: 01/30/23  5:00 PM    Specimen: Urine   Result Value Ref Range    Urine culture hold        Urine on hold in Microbiology dept for 2 days. If unpreserved urine is submitted, it cannot be used for addtional testing after 24 hours, recollection will be required.    CULTURE, BLOOD, PAIRED    Collection Time: 01/30/23  7:17 PM    Specimen: Blood   Result Value Ref Range    Special Requests: NO SPECIAL REQUESTS      Culture result: NO GROWTH AFTER 12 HOURS     CBC W/O DIFF    Collection Time: 01/31/23  6:47 AM   Result Value Ref Range    WBC 6.1 3.6 - 11.0 K/uL    RBC 3.83 3.80 - 5.20 M/uL    HGB 11.4 (L) 11.5 - 16.0 g/dL    HCT 34.9 (L) 35.0 - 47.0 %    MCV 91.1 80.0 - 99.0 FL    MCH 29.8 26.0 - 34.0 PG    MCHC 32.7 30.0 - 36.5 g/dL    RDW 14.2 11.5 - 14.5 %    PLATELET 579 261 - 577 K/uL    MPV 9.0 8.9 - 12.9 FL    NRBC 0.0 0  WBC    ABSOLUTE NRBC 0.00 0.00 - 6.86 K/uL   METABOLIC PANEL, BASIC    Collection Time: 01/31/23  6:47 AM   Result Value Ref Range    Sodium 140 136 - 145 mmol/L    Potassium 3.6 3.5 - 5.1 mmol/L    Chloride 108 97 - 108 mmol/L    CO2 25 21 - 32 mmol/L    Anion gap 7 5 - 15 mmol/L    Glucose 110 (H) 65 - 100 mg/dL    BUN 11 6 - 20 MG/DL    Creatinine 1.08 (H) 0.55 - 1.02 MG/DL    BUN/Creatinine ratio 10 (L) 12 - 20      eGFR 54 (L) >60 ml/min/1.73m2    Calcium 8.8 8.5 - 10.1 MG/DL   MAGNESIUM    Collection Time: 01/31/23  6:47 AM   Result Value Ref Range    Magnesium 2.3 1.6 - 2.4 mg/dL   PHOSPHORUS    Collection Time: 01/31/23  6:47 AM   Result Value Ref Range    Phosphorus 4.1 2.6 - 4.7 MG/DL       Additional comments:  I have reviewed the patient's new clinical lab test results. I have personally reviewed the patient's radiographs. MRI Results (most recent):  Results from [place] encounter on 01/30/23    MRI BRAIN WO CONT    Narrative  INDICATION:   CVA    EXAMINATION:  MRI BRAIN WO CONTRAST    COMPARISON:  CT 1/29/2023    TECHNIQUE:  Multiplanar multisequence acquisition without contrast of the brain. FINDINGS:    Ventricles:  Midline, no hydrocephalus. Brain Parenchyma/Brainstem:  Patchy chronic T2 hyperintensities throughout the  supratentorial white matter. Multiple small foci of acute infarction throughout  the right middle cerebral artery territory. Intracranial Hemorrhage:  Subtle hemosiderin deposition right centrum semiovale  suggest prior microhemorrhage. No acute hemorrhage. Basal Cisterns:  Normal.  Flow Voids:  Normal.  Additional Comments:  N/A. Impression  Multiple small foci of acute infarction throughout the right middle cerebral  artery territory. Background of chronic white matter disease as above. CT Results (most recent):  Results from Hospital Encounter encounter on 01/29/23    CT CODE NEURO HEAD WO CONTRAST    Narrative  Indication:  Stroke alert    Comparison: CT September 2014    Findings: 5 mm axial images were obtained from the skull base through the  vertex. CT dose reduction was achieved through the use of a standardized protocol  tailored for this examination and automatic exposure control for dose  modulation. The ventricles and cortical sulci are prominent, compatible with age related  volume loss. There is no evidence of intracranial hemorrhage, mass, mass effect,  or acute infarct. There is a chronic white matter infarct in the right frontal  lobe.  There is periventricular white matter disease. No extra-axial fluid  collections are seen. The visualized paranasal sinuses and mastoid air cells are  clear. The orbital structures are unremarkable. No osseous abnormalities are  seen. Impression  1. No evidence of acute infarct or intracranial hemorrhage. 2. Periventricular white matter disease is likely secondary to chronic small  vessel ischemic changes.       ANASTASIA GraciaP

## 2023-01-31 NOTE — PROGRESS NOTES
Problem: Pressure Injury - Risk of  Goal: *Prevention of pressure injury  Description: Document Juwan Scale and appropriate interventions in the flowsheet. Outcome: Progressing Towards Goal  Note: Pressure Injury Interventions:  Sensory Interventions: Assess changes in LOC, Discuss PT/OT consult with provider    Moisture Interventions: Check for incontinence Q2 hours and as needed, Apply protective barrier, creams and emollients    Activity Interventions: Pressure redistribution bed/mattress(bed type), PT/OT evaluation    Mobility Interventions: PT/OT evaluation, Pressure redistribution bed/mattress (bed type), HOB 30 degrees or less    Nutrition Interventions: Document food/fluid/supplement intake                     Problem: Patient Education: Go to Patient Education Activity  Goal: Patient/Family Education  Outcome: Progressing Towards Goal     Problem: Falls - Risk of  Goal: *Absence of Falls  Description: Document Daniel Fall Risk and appropriate interventions in the flowsheet.   Outcome: Progressing Towards Goal  Note: Fall Risk Interventions:                                Problem: Patient Education: Go to Patient Education Activity  Goal: Patient/Family Education  Outcome: Progressing Towards Goal     Problem: TIA/CVA Stroke: 0-24 hours  Goal: Off Pathway (Use only if patient is Off Pathway)  Outcome: Progressing Towards Goal  Goal: Activity/Safety  Outcome: Progressing Towards Goal  Goal: Consults, if ordered  Outcome: Progressing Towards Goal  Goal: Diagnostic Test/Procedures  Outcome: Progressing Towards Goal  Goal: Nutrition/Diet  Outcome: Progressing Towards Goal  Goal: Discharge Planning  Outcome: Progressing Towards Goal  Goal: Medications  Outcome: Progressing Towards Goal  Goal: Respiratory  Outcome: Progressing Towards Goal  Goal: Treatments/Interventions/Procedures  Outcome: Progressing Towards Goal  Goal: Minimize risk of bleeding post-thrombolytic infusion  Outcome: Progressing Towards Goal  Goal: Monitor for complications post-thrombolytic infusion  Outcome: Progressing Towards Goal  Goal: Psychosocial  Outcome: Progressing Towards Goal  Goal: *Hemodynamically stable  Outcome: Progressing Towards Goal  Goal: *Neurologically stable  Description: Absence of additional neurological deficits    Outcome: Progressing Towards Goal  Goal: *Verbalizes anxiety and depression are reduced or absent  Outcome: Progressing Towards Goal  Goal: *Absence of Signs of Aspiration on Current Diet  Outcome: Progressing Towards Goal  Goal: *Absence of deep venous thrombosis signs and symptoms(Stroke Metric)  Outcome: Progressing Towards Goal  Goal: *Ability to perform ADLs and demonstrates progressive mobility and function  Outcome: Progressing Towards Goal  Goal: *Stroke education started(Stroke Metric)  Outcome: Progressing Towards Goal  Goal: *Dysphagia screen performed(Stroke Metric)  Outcome: Progressing Towards Goal  Goal: *Rehab consulted(Stroke Metric)  Outcome: Progressing Towards Goal

## 2023-01-31 NOTE — PROGRESS NOTES
[facility]  Hospitalist Group                                                                                          Hospitalist Progress Note  [place], [place]  Answering service: [phone] OR 36 from in house phone        Date of Service:  2023  NAME:  Lia Oakes  :  1950  MRN:  [id number]      Admission Summary:   \"67year-old woman with past medical history significant for hypertension, anxiety/depression, dyslipidemia, restless legs syndrome presented at the outside facility emergency room with slurred speech and left arm weakness. These symptoms lasted for approximately 30 minutes and occurred one hour before coming to the emergency room. When the patient arrived at the emergency room, code stroke was activated. The CT scan of the head without contrast was obtained. This did not show acute pathology. The CTA of the head and neck was then obtained which shows severe right internal carotid artery stenosis. The patient was transferred to [facility] for further evaluation. The patient has no record of prior admission to this hospital.  The patient denies history of prior stroke. \"     Interval history / Subjective:   No acute interval events, pt states she feels good today. Awaiting cardiology input, may potentially need ID consult d/t mitral valve vegetations     Assessment & Plan:      CVA  Right internal carotid artery stenosis  Mitral valve vegetation   - Echo with 1.9 x 1.4 cm moderately sized vegetation that is mobile and irregular on the anterior leaflet.    - Cardiology consulted, will follow up with their recs   - BCX:VERONICAD  - Will page ID today and determine if a formal consult is needed    CVA  Right internal carotid artery stenosis  - Presenting with left arm weakness, slurred speech  - MRI brain with multiple small foci of acute infarction throughout the right middle cerebral artery territory- neuro consulted  - CTA brain with severe right internal carotid artery stenosis- neuroIR consulted and following for possible stent placement   - Continue statin  - Plavix, ASA    Acute cystitis w/ hematuria - follow culture, continue ceftriaxone     Acute renal insufficiency: unknown baseline, monitor    HTN  - Holding home meds for now    HLD  - continue statin     CAD s/p stent   - plavix    Restless leg- sinemet  Anxiety depression- wellbutrin  Anemia- stable, monitor      Code status: full   Prophylaxis: lovenox  Care Plan discussed with: patient, neuro, neuroIR  Anticipated Disposition: TBD, >48 hours now awaiting cardiology workup     Hospital Problems  Date Reviewed: 1/30/2023            Codes Class Noted POA    * (Principal) Acute CVA (cerebrovascular accident) [facility]) ICD-10-CM: I63.9  ICD-9-CM: 434.91  1/30/2023 Yes        Internal carotid artery stenosis, right ICD-10-CM: R11.35  ICD-9-CM: 433.10  1/29/2023 Unknown             Review of Systems:   A comprehensive review of systems was negative except for that written in the HPI. Vital Signs:    Last 24hrs VS reviewed since prior progress note. Most recent are:  Visit Vitals  BP (!) 157/76 (BP 1 Location: Left upper arm)   Pulse 90   Temp 98.3 °F (36.8 °C)   Resp 15   Ht 5' 5\" (1.651 m)   Wt 76.7 kg (169 lb)   SpO2 99%   BMI 28.12 kg/m²         Intake/Output Summary (Last 24 hours) at 1/31/2023 0802  Last data filed at 1/30/2023 7000  Gross per 24 hour   Intake --   Output 650 ml   Net -650 ml        Physical Examination:     I had a face to face encounter with this patient and independently examined them on 1/31/2023 as outlined below:          Constitutional:  No acute distress, cooperative, pleasant    ENT:  Oral mucosa moist, oropharynx benign. Resp:  CTA bilaterally. No wheezing/rhonchi/rales. No accessory muscle use. CV:  Regular rhythm, normal rate, no murmurs, gallops, rubs    GI:  Soft, non distended, non tender.  normoactive bowel sounds    Musculoskeletal:  No edema, warm, 2+ pulses throughout    Neurologic:  Moves all extremities. AAOx3, CN II-XII reviewed            Data Review:    Review and/or order of clinical lab test  Review and/or order of tests in the radiology section of CPT  Review and/or order of tests in the medicine section of [facility]      Labs:     Recent Labs     01/31/23 0647 01/29/23 2009   WBC 6.1 6.4   HGB 11.4* 10.8*   HCT 34.9* 32.4*    276     Recent Labs     01/31/23 0647 01/30/23  0409 01/29/23 2009     --  139   K 3.6  --  3.3*     --  105   CO2 25  --  27   BUN 11  --  17   CREA 1.08*  --  1.27*   *  --  97   CA 8.8  --  9.1   MG 2.3 2.3  --    PHOS 4.1 3.9  --      Recent Labs     01/29/23 2009   ALT 24   AP 57   TBILI 0.3   TP 6.8   ALB 3.5   GLOB 3.3     Recent Labs     01/29/23 2009   INR 1.1   PTP 14.2      Recent Labs     01/30/23 0630 01/30/23 0409   TIBC  --  334   PSAT  --  16*   FERR 38  --       Lab Results   Component Value Date/Time    Folate 11.5 01/30/2023 06:30 AM      No results for input(s): PH, PCO2, PO2 in the last 72 hours. No results for input(s): CPK, CKNDX, TROIQ in the last 72 hours.     No lab exists for component: CPKMB  Lab Results   Component Value Date/Time    Cholesterol, total 191 01/30/2023 04:09 AM    HDL Cholesterol 38 01/30/2023 04:09 AM    LDL, calculated 105.4 (H) 01/30/2023 04:09 AM    Triglyceride 238 (H) 01/30/2023 04:09 AM    CHOL/HDL Ratio 5.0 01/30/2023 04:09 AM     Lab Results   Component Value Date/Time    Glucose (POC) 107 (H) 01/29/2023 07:34 PM    Glucose (POC) 78 08/10/2013 03:12 PM     Lab Results   Component Value Date/Time    Color Yellow/Straw 01/29/2023 08:09 PM    Appearance Hazy (A) 01/29/2023 08:09 PM    Specific gravity 1.010 01/29/2023 08:09 PM    pH (UA) 5.0 01/29/2023 08:09 PM    Protein Negative 01/29/2023 08:09 PM    Glucose Negative 01/29/2023 08:09 PM    Ketone Negative 01/29/2023 08:09 PM    Bilirubin Negative 01/29/2023 08:09 PM    Urobilinogen 0.1 (L) 01/29/2023 08:09 PM    Nitrites Positive (A) 01/29/2023 08:09 PM    Leukocyte Esterase Large (A) 01/29/2023 08:09 PM    Epithelial cells Many (A) 01/29/2023 08:09 PM    Bacteria 1+ (A) 01/29/2023 08:09 PM    WBC >100 (H) 01/29/2023 08:09 PM    RBC 10-20 01/29/2023 08:09 PM         Medications Reviewed:     Current Facility-Administered Medications   Medication Dose Route Frequency    ARIPiprazole (ABILIFY) tablet 15 mg  15 mg Oral DAILY    buPROPion SR (WELLBUTRIN SR) tablet 150 mg  150 mg Oral QHS    carbidopa-levodopa (SINEMET)  mg per tablet 1 Tablet  1 Tablet Oral DAILY    clopidogreL (PLAVIX) tablet 75 mg  75 mg Oral DAILY    escitalopram oxalate (LEXAPRO) tablet 20 mg  20 mg Oral DAILY    pantoprazole (PROTONIX) tablet 40 mg  40 mg Oral ACB    acetaminophen (TYLENOL) tablet 650 mg  650 mg Oral Q4H PRN    Or    acetaminophen (TYLENOL) solution 650 mg  650 mg Per NG tube Q4H PRN    Or    acetaminophen (TYLENOL) suppository 650 mg  650 mg Rectal Q4H PRN    ondansetron (ZOFRAN) injection 4 mg  4 mg IntraVENous Q6H PRN    aspirin chewable tablet 81 mg  81 mg Oral DAILY    bisacodyL (DULCOLAX) tablet 5 mg  5 mg Oral DAILY PRN    enoxaparin (LOVENOX) injection 40 mg  40 mg SubCUTAneous Q24H    cefTRIAXone (ROCEPHIN) 1 g in 0.9% sodium chloride 10 mL IV syringe  1 g IntraVENous Q24H    buPROPion SR (WELLBUTRIN SR) tablet 300 mg  300 mg Oral DAILY    atorvastatin (LIPITOR) tablet 40 mg  40 mg Oral QHS    melatonin tablet 3 mg  3 mg Oral QHS PRN     ______________________________________________________________________  EXPECTED LENGTH OF STAY: - - -  ACTUAL LENGTH OF STAY:          1                 Hunt Bays Milligram, PA-C

## 2023-01-31 NOTE — PROGRESS NOTES
Problem: Pressure Injury - Risk of  Goal: *Prevention of pressure injury  Description: Document Juwan Scale and appropriate interventions in the flowsheet. Outcome: Progressing Towards Goal  Note: Pressure Injury Interventions:  Sensory Interventions: Assess changes in LOC, Float heels, Keep linens dry and wrinkle-free    Moisture Interventions: Absorbent underpads, Apply protective barrier, creams and emollients, Internal/External urinary devices    Activity Interventions: PT/OT evaluation, Increase time out of bed    Mobility Interventions: HOB 30 degrees or less    Nutrition Interventions: Document food/fluid/supplement intake                     Problem: Falls - Risk of  Goal: *Absence of Falls  Description: Document Daniel Fall Risk and appropriate interventions in the flowsheet.   Outcome: Progressing Towards Goal  Note: Fall Risk Interventions:

## 2023-02-01 LAB
ANION GAP SERPL CALC-SCNC: 6 MMOL/L (ref 5–15)
BASOPHILS # BLD: 0.1 K/UL (ref 0–0.1)
BASOPHILS NFR BLD: 1 % (ref 0–1)
BUN SERPL-MCNC: 12 MG/DL (ref 6–20)
BUN/CREAT SERPL: 11 (ref 12–20)
CALCIUM SERPL-MCNC: 8.9 MG/DL (ref 8.5–10.1)
CHLORIDE SERPL-SCNC: 106 MMOL/L (ref 97–108)
CO2 SERPL-SCNC: 26 MMOL/L (ref 21–32)
CREAT SERPL-MCNC: 1.14 MG/DL (ref 0.55–1.02)
DIFFERENTIAL METHOD BLD: ABNORMAL
EOSINOPHIL # BLD: 0.3 K/UL (ref 0–0.4)
EOSINOPHIL NFR BLD: 3 % (ref 0–7)
ERYTHROCYTE [DISTWIDTH] IN BLOOD BY AUTOMATED COUNT: 14 % (ref 11.5–14.5)
GLUCOSE SERPL-MCNC: 99 MG/DL (ref 65–100)
HCT VFR BLD AUTO: 34.8 % (ref 35–47)
HGB BLD-MCNC: 11.5 G/DL (ref 11.5–16)
IMM GRANULOCYTES # BLD AUTO: 0 K/UL (ref 0–0.04)
IMM GRANULOCYTES NFR BLD AUTO: 0 % (ref 0–0.5)
LYMPHOCYTES # BLD: 0.7 K/UL (ref 0.8–3.5)
LYMPHOCYTES NFR BLD: 8 % (ref 12–49)
MCH RBC QN AUTO: 30.3 PG (ref 26–34)
MCHC RBC AUTO-ENTMCNC: 33 G/DL (ref 30–36.5)
MCV RBC AUTO: 91.8 FL (ref 80–99)
MONOCYTES # BLD: 0.7 K/UL (ref 0–1)
MONOCYTES NFR BLD: 8 % (ref 5–13)
NEUTS SEG # BLD: 6.5 K/UL (ref 1.8–8)
NEUTS SEG NFR BLD: 80 % (ref 32–75)
NRBC # BLD: 0 K/UL (ref 0–0.01)
NRBC BLD-RTO: 0 PER 100 WBC
PLATELET # BLD AUTO: 281 K/UL (ref 150–400)
PMV BLD AUTO: 9 FL (ref 8.9–12.9)
POTASSIUM SERPL-SCNC: 3.8 MMOL/L (ref 3.5–5.1)
RBC # BLD AUTO: 3.79 M/UL (ref 3.8–5.2)
RBC MORPH BLD: ABNORMAL
SODIUM SERPL-SCNC: 138 MMOL/L (ref 136–145)
WBC # BLD AUTO: 8.3 K/UL (ref 3.6–11)

## 2023-02-01 PROCEDURE — 36415 COLL VENOUS BLD VENIPUNCTURE: CPT

## 2023-02-01 PROCEDURE — 80048 BASIC METABOLIC PNL TOTAL CA: CPT

## 2023-02-01 PROCEDURE — 65270000046 HC RM TELEMETRY

## 2023-02-01 PROCEDURE — 74011250636 HC RX REV CODE- 250/636: Performed by: INTERNAL MEDICINE

## 2023-02-01 PROCEDURE — 74011250637 HC RX REV CODE- 250/637: Performed by: INTERNAL MEDICINE

## 2023-02-01 PROCEDURE — 99254 IP/OBS CNSLTJ NEW/EST MOD 60: CPT | Performed by: STUDENT IN AN ORGANIZED HEALTH CARE EDUCATION/TRAINING PROGRAM

## 2023-02-01 PROCEDURE — 74011250637 HC RX REV CODE- 250/637: Performed by: NURSE PRACTITIONER

## 2023-02-01 PROCEDURE — 74011000250 HC RX REV CODE- 250: Performed by: INTERNAL MEDICINE

## 2023-02-01 PROCEDURE — 85025 COMPLETE CBC W/AUTO DIFF WBC: CPT

## 2023-02-01 RX ADMIN — ENOXAPARIN SODIUM 40 MG: 100 INJECTION SUBCUTANEOUS at 06:05

## 2023-02-01 RX ADMIN — CEFTRIAXONE 2 G: 2 INJECTION, POWDER, FOR SOLUTION INTRAMUSCULAR; INTRAVENOUS at 22:38

## 2023-02-01 RX ADMIN — CARBIDOPA AND LEVODOPA 1 TABLET: 25; 100 TABLET ORAL at 09:54

## 2023-02-01 RX ADMIN — PANTOPRAZOLE SODIUM 40 MG: 40 TABLET, DELAYED RELEASE ORAL at 07:53

## 2023-02-01 RX ADMIN — CLOPIDOGREL BISULFATE 75 MG: 75 TABLET ORAL at 09:54

## 2023-02-01 RX ADMIN — ASPIRIN 81 MG: 81 TABLET, CHEWABLE ORAL at 09:53

## 2023-02-01 RX ADMIN — ARIPIPRAZOLE 15 MG: 10 TABLET ORAL at 09:53

## 2023-02-01 RX ADMIN — ESCITALOPRAM OXALATE 20 MG: 10 TABLET ORAL at 09:54

## 2023-02-01 RX ADMIN — ACETAMINOPHEN 650 MG: 325 TABLET ORAL at 20:35

## 2023-02-01 RX ADMIN — BUPROPION HYDROCHLORIDE 150 MG: 150 TABLET, EXTENDED RELEASE ORAL at 22:37

## 2023-02-01 RX ADMIN — BUPROPION HYDROCHLORIDE 300 MG: 150 TABLET, EXTENDED RELEASE ORAL at 09:54

## 2023-02-01 RX ADMIN — ACETAMINOPHEN 650 MG: 325 TABLET ORAL at 14:19

## 2023-02-01 RX ADMIN — ATORVASTATIN CALCIUM 40 MG: 40 TABLET, FILM COATED ORAL at 22:38

## 2023-02-01 NOTE — PROGRESS NOTES
Problem: Falls - Risk of  Goal: *Absence of Falls  Description: Document Tiffany De La Fuente Fall Risk and appropriate interventions in the flowsheet.   Outcome: Progressing Towards Goal  Note: Fall Risk Interventions:  Mobility Interventions: Assess mobility with egress test, Patient to call before getting OOB, PT Consult for mobility concerns         Medication Interventions: Assess postural VS orthostatic hypotension, Patient to call before getting OOB, Teach patient to arise slowly    Elimination Interventions: Call light in reach, Patient to call for help with toileting needs, Stay With Me (per policy)              Problem: TIA/CVA Stroke: Day 2 Until Discharge  Goal: Activity/Safety  Outcome: Progressing Towards Goal  Goal: Diagnostic Test/Procedures  Outcome: Progressing Towards Goal  Goal: Nutrition/Diet  Outcome: Progressing Towards Goal  Goal: Discharge Planning  Outcome: Progressing Towards Goal  Goal: Medications  Outcome: Progressing Towards Goal  Goal: Respiratory  Outcome: Progressing Towards Goal  Goal: Treatments/Interventions/Procedures  Outcome: Progressing Towards Goal  Goal: Psychosocial  Outcome: Progressing Towards Goal  Goal: *Verbalizes anxiety and depression are reduced or absent  Outcome: Progressing Towards Goal  Goal: *Absence of aspiration  Outcome: Progressing Towards Goal  Goal: *Absence of deep venous thrombosis signs and symptoms(Stroke Metric)  Outcome: Progressing Towards Goal  Goal: *Optimal pain control at patient's stated goal  Outcome: Progressing Towards Goal  Goal: *Tolerating diet  Outcome: Progressing Towards Goal  Goal: *Ability to perform ADLs and demonstrates progressive mobility and function  Outcome: Progressing Towards Goal  Goal: *Stroke education continued(Stroke Metric)  Outcome: Progressing Towards Goal

## 2023-02-01 NOTE — CONSULTS
Infectious Disease Consult Note    Reason for Consult: MV vegetation  Date of Consultation: February 1, 2023  Date of Admission: 1/30/2023  Referring Physician: Dr. Vel Martinez      HPI:  Nika Mejía is a 67y.o. year old female with history of CAD, who presented on 1/30/23 for acute onset of slurred speech and left arm weakness. Code stroke was activated. The CT scan of the head without contrast was obtained. This did not show acute pathology. MRI brain showed Multiple small foci of acute infarction throughout the right middle cerebral artery territory. The CTA of the head and neck was then obtained which shows severe right internal carotid artery stenosis of 80-90%. The patient denies history of prior stroke. As part of the stroke work-up, TTE was obtained which showed a 1.9 x 1.4 cm moderately sized vegetation present on the MV that is mobile and irregular on the anterior leaflet. Hence infectious disease consultation was called for. Patient has been afebrile and hemodynamically stable since arrival.  No leukocytosis so far. Patient denies any history of fevers, malaise, any other systemic signs of illness prior to the presentation. She lives locally, has not traveled internationally all her life. Denies any consumption of any unpasteurized milk products, does not consume any raw meats. Has no pets. Denies any history of smoking all her life. She lives with her . Denies any history of cancers, prosthetic materials in her body, recent dental procedures, dental pain. Patient denied any rest will symptoms from her stroke and reports that they did not last long after they started. Urinalysis was done in the ED for unclear reasons. Had pyuria greater than 100 however with many epithelial cells. Patient is on ceftriaxone for UTI though she has no symptoms corresponding to this.       Past Medical History:  Past Medical History:   Diagnosis Date    Depression     HTN (hypertension) Surgical History:  Past Surgical History:   Procedure Laterality Date    HX CHOLECYSTECTOMY      HX GI      HX GYN      HX ORTHOPAEDIC      [place] UNLISTED PROCEDURE CARDIAC SURGERY           Family History:   No family history on file. Social History:   See HPI above. Allergies: Allergies   Allergen Reactions    Iodinated Contrast Media Hives     Okay with benedryl     Morphine Unknown (comments)         Review of Systems:     Negative except as in HPI    Medications:  No current facility-administered medications on file prior to encounter. Current Outpatient Medications on File Prior to Encounter   Medication Sig Dispense Refill    buPROPion SR (WELLBUTRIN SR) 150 mg SR tablet Take 150 mg by mouth nightly. clopidogreL (PLAVIX) 75 mg tab Take 75 mg by mouth daily. chlorthalidone (HYGROTON) 25 mg tablet Take 25 mg by mouth daily. ARIPiprazole (ABILIFY) 15 mg tablet       buPROPion SR (WELLBUTRIN SR) 150 mg SR tablet Take 300 mg by mouth daily. carbidopa-levodopa (SINEMET)  mg per tablet Take 1 Tablet by mouth nightly. ascorbic acid, vitamin C, (VITAMIN C) 500 mg tablet 500 mg nightly. turmeric root extract 500 mg cap 1 tablet      vitamins a & d cap 1 capsule      escitalopram oxalate (LEXAPRO) 20 mg tablet Take 20 mg by mouth daily. esomeprazole (NEXIUM) 40 mg capsule Take 40 mg by mouth Daily (before breakfast). Indications: gastroesophageal reflux disease      pravastatin (PRAVACHOL) 20 mg tablet Take 20 mg by mouth nightly. Indications: high cholesterol      metoprolol tartrate (LOPRESSOR) 25 mg tablet Take 25 mg by mouth two (2) times a day. cyclobenzaprine (FLEXERIL) 10 mg tablet Take 10 mg by mouth two (2) times daily as needed for Muscle Spasm(s) (back pain). diclofenac (VOLTAREN) 1 % gel Apply 2 g to affected area two (2) times daily as needed for Pain (to knees and feet as needed for pain).       ergocalciferol (ERGOCALCIFEROL) 1,250 mcg (50,000 unit) capsule Take 50,000 Units by mouth every Monday and Thursday. ferrous sulfate 325 mg (65 mg iron) tablet Take 325 mg by mouth daily (with breakfast). gabapentin (NEURONTIN) 600 mg tablet Take 600 mg by mouth daily as needed for Pain (BACK PAIN). prochlorperazine (COMPAZINE) 5 mg tablet 1-2 tabs po every 6 hours prn nausea 40 Tab 0    traMADol (ULTRAM) 50 mg tablet Take 1 Tab by mouth every six (6) hours as needed for Pain. (Patient taking differently: Take 50 mg by mouth three (3) times daily as needed.  Indications: pain) 30 Tab 0           Physical Exam:    Vitals: Patient Vitals for the past 24 hrs:   Temp Pulse Resp BP SpO2   02/01/23 1400 98 °F (36.7 °C) 81 19 138/72 98 %   02/01/23 1200 -- 95 -- -- --   02/01/23 1000 -- 97 -- -- --   02/01/23 0958 98.1 °F (36.7 °C) 99 16 (!) 141/75 99 %   02/01/23 0601 -- 84 -- -- --   02/01/23 0357 -- 81 -- -- --   02/01/23 0213 98.1 °F (36.7 °C) -- -- -- --   02/01/23 0204 -- 83 -- -- --   02/01/23 0200 98.1 °F (36.7 °C) 100 21 137/82 --   01/31/23 2200 98.3 °F (36.8 °C) 94 20 (!) 130/110 --   01/31/23 2157 -- 92 -- -- --   01/31/23 2001 -- 85 -- -- --   01/31/23 1800 -- 80 -- -- --     GEN: NAD  HEENT: Normocephalic, atraumatic, PERRL, no scleral icterus,  CV: Hemodynamically stable  Lungs: Breathing comfortably  Abdomen: soft, non distended, non tender  Genitourinary:  no oneil  Extremities: no edema  Neuro: Alert, oriented to time, place and situation, moves all extremities to commands, verbal   Skin: no rash  Psych: good affect, good eye contact, non tearful   Lines: Peripheral IV lines      Labs:   Recent Results (from the past 24 hour(s))   CBC WITH AUTOMATED DIFF    Collection Time: 02/01/23  5:53 AM   Result Value Ref Range    WBC 8.3 3.6 - 11.0 K/uL    RBC 3.79 (L) 3.80 - 5.20 M/uL    HGB 11.5 11.5 - 16.0 g/dL    HCT 34.8 (L) 35.0 - 47.0 %    MCV 91.8 80.0 - 99.0 FL    MCH 30.3 26.0 - 34.0 PG    MCHC 33.0 30.0 - 36.5 g/dL    RDW 14.0 11.5 - 14.5 %    PLATELET 288 355 - 526 K/uL    MPV 9.0 8.9 - 12.9 FL    NRBC 0.0 0  WBC    ABSOLUTE NRBC 0.00 0.00 - 0.01 K/uL    NEUTROPHILS 80 (H) 32 - 75 %    LYMPHOCYTES 8 (L) 12 - 49 %    MONOCYTES 8 5 - 13 %    EOSINOPHILS 3 0 - 7 %    BASOPHILS 1 0 - 1 %    IMMATURE GRANULOCYTES 0 0.0 - 0.5 %    ABS. NEUTROPHILS 6.5 1.8 - 8.0 K/UL    ABS. LYMPHOCYTES 0.7 (L) 0.8 - 3.5 K/UL    ABS. MONOCYTES 0.7 0.0 - 1.0 K/UL    ABS. EOSINOPHILS 0.3 0.0 - 0.4 K/UL    ABS. BASOPHILS 0.1 0.0 - 0.1 K/UL    ABS. IMM. GRANS. 0.0 0.00 - 0.04 K/UL    DF SMEAR SCANNED      RBC COMMENTS NORMOCYTIC, NORMOCHROMIC     METABOLIC PANEL, BASIC    Collection Time: 02/01/23  5:53 AM   Result Value Ref Range    Sodium 138 136 - 145 mmol/L    Potassium 3.8 3.5 - 5.1 mmol/L    Chloride 106 97 - 108 mmol/L    CO2 26 21 - 32 mmol/L    Anion gap 6 5 - 15 mmol/L    Glucose 99 65 - 100 mg/dL    BUN 12 6 - 20 MG/DL    Creatinine 1.14 (H) 0.55 - 1.02 MG/DL    BUN/Creatinine ratio 11 (L) 12 - 20      eGFR 51 (L) >60 ml/min/1.73m2    Calcium 8.9 8.5 - 10.1 MG/DL       Microbiology Data: In HPI        Assessment:   50-year-old female with:    -Acute right-sided stroke to the right MCA territory. Finding of severe right ICA stenosis of 80 to 90% on CTA. As part of the stroke work-up, TTE was obtained which showed a 1.9 x 1.4 cm moderately sized vegetation present on the MV that is mobile and irregular on the anterior leaflet. Hence infectious disease consultation was called for. -TTE finding of mobile mitral valve vegetation; unclear etiology at present and unclear if involvement with stroke. Recommendations:  Patient has no clinical hx supporting an infectious etiology of the MV vegetation. Need for ELPIDIO to delineate the TTE finding was relayed to Dr. Comfort Mohan and this is being planned for tomorrow. D/w case with Neurology NP; and etiology of the stroke is likely due to the R ICA stenosis. Blood cultures are in process.   We will send another set of blood cultures (as part of standard work-up for culture-negative endocarditis. At this time I am not sure if we need to initiate work-up for culture-negative endocarditis looking for rare etiologies as per history this patient has no supporting link to those agents. Hypercoagulable work-up must also be sent. Patient is on ceftriaxone for ? UTI - clinically no UTI as no symptoms and unclear why this was even obtained - will continue for now, but likely plan to stop this by 2/2/23. ID will follow. Thank for the opportunity to participate in the care of this patient. Please contact with questions or concerns.            Tahira Mena MD  Infectious Diseases

## 2023-02-01 NOTE — CONSULTS
CARDIOLOGY CONSULT                  Subjective:    Date of  Admission:   Admission type:Emergency    Consult ordered in the computer on 1/30 at 1527 but called in to the office on 2/1 at 56    This is a 67 yof with CAD, PAD who is followed by Dr. Olinda Johnson at [facility]. She had presented with slurring of speech and LUE weakness which lasted for 30 minutes and resolved PTA at the ED. CTA showed severe right ICA stenosis. She had an echo which showed  a possible MV vegetation. She has had blood cultures which have had NGTD. She has no symptoms currently of an infectious or neurologic variety. Lolita Thurston MD     Patient Active Problem List   Diagnosis Code    Acute CVA (cerebrovascular accident) [facility]) I63.9    Internal carotid artery stenosis, right I65.21        Past Medical History:   Diagnosis Date    Depression     HTN (hypertension)       Past Surgical History:   Procedure Laterality Date    HX CHOLECYSTECTOMY      HX GI      HX GYN      HX ORTHOPAEDIC      [place] UNLISTED PROCEDURE CARDIAC SURGERY        No family history on file.    Social History     Socioeconomic History    Marital status:      Spouse name: Not on file    Number of children: Not on file    Years of education: Not on file    Highest education level: Not on file   Occupational History    Not on file   Tobacco Use    Smoking status: Never    Smokeless tobacco: Never   Substance and Sexual Activity    Alcohol use: Never    Drug use: Never    Sexual activity: Not on file   Other Topics Concern    Not on file   Social History Narrative    Not on file     Social Determinants of Health     Financial Resource Strain: Not on file   Food Insecurity: Not on file   Transportation Needs: Not on file   Physical Activity: Not on file   Stress: Not on file   Social Connections: Not on file   Intimate Partner Violence: Not on file   Housing Stability: Not on file        Current Facility-Administered Medications Medication Dose Route Frequency    cefTRIAXone (ROCEPHIN) 2 g in 0.9% sodium chloride 20 mL IV syringe  2 g IntraVENous Q24H    ARIPiprazole (ABILIFY) tablet 15 mg  15 mg Oral DAILY    buPROPion SR (WELLBUTRIN SR) tablet 150 mg  150 mg Oral QHS    carbidopa-levodopa (SINEMET)  mg per tablet 1 Tablet  1 Tablet Oral DAILY    clopidogreL (PLAVIX) tablet 75 mg  75 mg Oral DAILY    escitalopram oxalate (LEXAPRO) tablet 20 mg  20 mg Oral DAILY    pantoprazole (PROTONIX) tablet 40 mg  40 mg Oral ACB    acetaminophen (TYLENOL) tablet 650 mg  650 mg Oral Q4H PRN    Or    acetaminophen (TYLENOL) solution 650 mg  650 mg Per NG tube Q4H PRN    Or    acetaminophen (TYLENOL) suppository 650 mg  650 mg Rectal Q4H PRN    ondansetron (ZOFRAN) injection 4 mg  4 mg IntraVENous Q6H PRN    aspirin chewable tablet 81 mg  81 mg Oral DAILY    bisacodyL (DULCOLAX) tablet 5 mg  5 mg Oral DAILY PRN    enoxaparin (LOVENOX) injection 40 mg  40 mg SubCUTAneous Q24H    buPROPion SR (WELLBUTRIN SR) tablet 300 mg  300 mg Oral DAILY    atorvastatin (LIPITOR) tablet 40 mg  40 mg Oral QHS    melatonin tablet 3 mg  3 mg Oral QHS PRN             Review of Symptoms:    Gen - no F/C/S  Eyes - no vision changes  ENT - no sore throat, rhinorrhea, otalgia  CV - no CP, no palpitations, no orthopnea, no PND, no KOJO  Resp no cough, no SOB/CARRANZA  GI - no AP, no n/v/d/c   - no dysuria, no hematuria  MSK - no abnormal joint pains  Skin - no rashes  Neuro - no HA, no numbness, no weakness, no slurred speech  Psych - no change in mood       Physical Exam    /72   Pulse 81   Temp 98 °F (36.7 °C)   Resp 19   Ht 5' 5\" (1.651 m)   Wt 76.7 kg (169 lb)   SpO2 98%   BMI 28.12 kg/m²      NAD  Skin warm and dry  Nl conjunctiva  Oropharynx without exudate.     Neck supple  Lungs clear  Normal S1/ S2 with occasional ectopy  No Murmurs, click or Rubs  Abdomen soft and non tender  Pulses 2+ radials  Neuro:  Grossly intact  Appropriate Cardiographics    Telemetry: normal sinus rhythm  ECG: NSR  Echocardiogram: reviewed    Assessment: This is a 67 yof with CAD, PAD, recent TIA consulted for concern for possible vegetation on MV. She has had an echo with Dr. Deedee Leary in 2021 which showed MV thickening but there was no mention of concern for vegetation.  Reviewed echo here and echodensity is easily noticeable    Plan:    NPO after breakfast for ELPIDIO tomorrow at 1430  Agree with DAPT, statin   Reviewed pt labs    Please call with questions

## 2023-02-01 NOTE — PROGRESS NOTES
Transition of Care Plan: home with OP therapy   *may need IV abx for mitral valve vegetation. ID consulted     RUR: 12% low     PCP F/U: Felipa Strong MD     Disposition: home with OP therapy      Transportation: family    56: CM continuing to follow. Patient may need IV abx for mitral valve vegetation.      Calli Arceo RN, CRM

## 2023-02-01 NOTE — PROGRESS NOTES
Bedside and Verbal shift change report given to GRANT Jones (oncoming nurse) by Mellisa Ramos LPN (offgoing nurse). Report included the following information SBAR, Kardex, ED Summary, MAR, and Recent Results.

## 2023-02-02 ENCOUNTER — APPOINTMENT (OUTPATIENT)
Dept: NON INVASIVE DIAGNOSTICS | Age: 73
End: 2023-02-02
Attending: INTERNAL MEDICINE
Payer: MEDICARE

## 2023-02-02 ENCOUNTER — ANESTHESIA EVENT (OUTPATIENT)
Dept: NON INVASIVE DIAGNOSTICS | Age: 73
End: 2023-02-02
Payer: MEDICARE

## 2023-02-02 ENCOUNTER — ANESTHESIA (OUTPATIENT)
Dept: NON INVASIVE DIAGNOSTICS | Age: 73
End: 2023-02-02
Payer: MEDICARE

## 2023-02-02 PROCEDURE — C8925 2D TEE W OR W/O FOL W/CON,IN: HCPCS

## 2023-02-02 PROCEDURE — 76060000031 HC ANESTHESIA FIRST 0.5 HR

## 2023-02-02 PROCEDURE — 74011250636 HC RX REV CODE- 250/636: Performed by: INTERNAL MEDICINE

## 2023-02-02 PROCEDURE — 65270000046 HC RM TELEMETRY

## 2023-02-02 PROCEDURE — 74011250637 HC RX REV CODE- 250/637: Performed by: NURSE PRACTITIONER

## 2023-02-02 PROCEDURE — 74011250637 HC RX REV CODE- 250/637: Performed by: INTERNAL MEDICINE

## 2023-02-02 PROCEDURE — B24BZZ4 ULTRASONOGRAPHY OF HEART WITH AORTA, TRANSESOPHAGEAL: ICD-10-PCS | Performed by: INTERNAL MEDICINE

## 2023-02-02 PROCEDURE — 36415 COLL VENOUS BLD VENIPUNCTURE: CPT

## 2023-02-02 PROCEDURE — 74011250637 HC RX REV CODE- 250/637

## 2023-02-02 PROCEDURE — 74011250636 HC RX REV CODE- 250/636: Performed by: NURSE ANESTHETIST, CERTIFIED REGISTERED

## 2023-02-02 PROCEDURE — 87040 BLOOD CULTURE FOR BACTERIA: CPT

## 2023-02-02 RX ORDER — SODIUM CHLORIDE 9 MG/ML
INJECTION, SOLUTION INTRAVENOUS
Status: DISCONTINUED | OUTPATIENT
Start: 2023-02-02 | End: 2023-02-02 | Stop reason: HOSPADM

## 2023-02-02 RX ORDER — PROPOFOL 10 MG/ML
INJECTION, EMULSION INTRAVENOUS AS NEEDED
Status: DISCONTINUED | OUTPATIENT
Start: 2023-02-02 | End: 2023-02-02 | Stop reason: HOSPADM

## 2023-02-02 RX ADMIN — ACETAMINOPHEN 650 MG: 325 TABLET ORAL at 21:50

## 2023-02-02 RX ADMIN — PROPOFOL 50 MG: 10 INJECTION, EMULSION INTRAVENOUS at 14:40

## 2023-02-02 RX ADMIN — ACETAMINOPHEN 650 MG: 325 TABLET ORAL at 16:04

## 2023-02-02 RX ADMIN — ARIPIPRAZOLE 15 MG: 10 TABLET ORAL at 09:08

## 2023-02-02 RX ADMIN — CLOPIDOGREL BISULFATE 75 MG: 75 TABLET ORAL at 09:02

## 2023-02-02 RX ADMIN — ESCITALOPRAM OXALATE 20 MG: 10 TABLET ORAL at 09:03

## 2023-02-02 RX ADMIN — ACETAMINOPHEN 650 MG: 325 TABLET ORAL at 09:02

## 2023-02-02 RX ADMIN — SODIUM CHLORIDE: 900 INJECTION, SOLUTION INTRAVENOUS at 14:34

## 2023-02-02 RX ADMIN — Medication 3 MG: at 21:50

## 2023-02-02 RX ADMIN — BUPROPION HYDROCHLORIDE 300 MG: 150 TABLET, EXTENDED RELEASE ORAL at 09:02

## 2023-02-02 RX ADMIN — ENOXAPARIN SODIUM 40 MG: 100 INJECTION SUBCUTANEOUS at 06:31

## 2023-02-02 RX ADMIN — PROPOFOL 50 MG: 10 INJECTION, EMULSION INTRAVENOUS at 14:41

## 2023-02-02 RX ADMIN — PANTOPRAZOLE SODIUM 40 MG: 40 TABLET, DELAYED RELEASE ORAL at 06:31

## 2023-02-02 RX ADMIN — BUPROPION HYDROCHLORIDE 150 MG: 150 TABLET, EXTENDED RELEASE ORAL at 21:50

## 2023-02-02 RX ADMIN — ATORVASTATIN CALCIUM 40 MG: 40 TABLET, FILM COATED ORAL at 21:50

## 2023-02-02 RX ADMIN — PROPOFOL 100 MG: 10 INJECTION, EMULSION INTRAVENOUS at 14:38

## 2023-02-02 RX ADMIN — CARBIDOPA AND LEVODOPA 1 TABLET: 25; 100 TABLET ORAL at 09:02

## 2023-02-02 RX ADMIN — ASPIRIN 81 MG: 81 TABLET, CHEWABLE ORAL at 09:02

## 2023-02-02 NOTE — ANESTHESIA PREPROCEDURE EVALUATION
Relevant Problems   No relevant active problems       Anesthetic History   No history of anesthetic complications            Review of Systems / Medical History  Patient summary reviewed, nursing notes reviewed and pertinent labs reviewed    Pulmonary  Within defined limits                 Neuro/Psych       CVA      Comments: Severe right internal carotid artery stenosis of 80-90% Cardiovascular    Hypertension          CAD and cardiac stents    Exercise tolerance: >4 METS  Comments: ECHO 1/30/23:    Left Ventricle: Normal left ventricular systolic function with a visually estimated EF of 60 - 65%. Left ventricle size is normal. Increased wall thickness. Findings consistent with mild concentric hypertrophy. Normal wall motion.   Mitral Valve:Mild regurgitation. There is a 1.9 x 1.4 cm moderately sized vegetation present that is mobile and irregular on the anterior leaflet.      GI/Hepatic/Renal  Within defined limits              Endo/Other  Within defined limits           Other Findings              Physical Exam    Airway  Mallampati: II  TM Distance: 4 - 6 cm  Neck ROM: normal range of motion   Mouth opening: Normal     Cardiovascular    Rhythm: regular  Rate: normal         Dental  No notable dental hx       Pulmonary  Breath sounds clear to auscultation               Abdominal  GI exam deferred       Other Findings            Anesthetic Plan    ASA: 3  Anesthesia type: MAC          Induction: Intravenous  Anesthetic plan and risks discussed with: Patient

## 2023-02-02 NOTE — PROGRESS NOTES
TRANSFER - OUT REPORT:    Verbal report given to lui Campbell RN on [id number] being transferred to [phone] for routine progression of care       Report consisted of patient's Situation, Background, Assessment and   Recommendations(SBAR). Information from the following report(s) SBAR, Procedure Summary and MAR was reviewed with the receiving nurse. Opportunity for questions and clarification was provided. Patient transported with:   Monitor with Nahed Lara, noninvasive cardiology RN on stretcher back to unit; pt awake, alert, and oriented. VSS. Pt denies pain, SOB, or dizziness.

## 2023-02-02 NOTE — PROGRESS NOTES
Cardiology Progress Note  2023     Admit Date: 2023  Admit Diagnosis: Acute CVA (cerebrovascular accident) ([facility] [address] [I63.9]  CC: none currently    Assessment:   Principal Problem:    Acute CVA (cerebrovascular accident) ([facility] [address] (2023)    Active Problems:    Internal carotid artery stenosis, right (2023)      Plan:     ELPIDIO as above  Cont current cardiac meds  No further IP cardiac plans, signing off, please call with questions  D/w Dr. Claudio Roy:      Tu Mckeon did well with ELPIDIO. Objective:    Physical Exam:  Overall VSSAF;  Visit Vitals  /62   Pulse 83   Temp 98 °F (36.7 °C)   Resp 20   Ht 5' 5\" (1.651 m)   Wt 76.7 kg (169 lb)   SpO2 97%   BMI 28.12 kg/m²     Temp (24hrs), Av.9 °F (36.6 °C), Min:97.7 °F (36.5 °C), Max:98 °F (36.7 °C)    Patient Vitals for the past 8 hrs:   Pulse   23 1508 83   23 1505 83   23 1502 83   23 1459 85   23 1456 90   23 1452 92   23 1451 92   23 1448 88   23 1200 95   23 1021 79   23 1000 81    Patient Vitals for the past 8 hrs:   Resp   23 1508 20   23 1505 22   23 1502 14   23 1459 17   23 1456 15   23 1452 16   23 1451 14   23 1448 16   23 1021 15    Patient Vitals for the past 8 hrs:   BP   23 1508 116/62   23 1505 104/62   23 1502 (!) 103/52   23 1459 (!) 119/58   23 1456 114/66   23 1452 (!) 118/58   23 1451 (!) 118/58   23 1448 (!) 106/44   23 1021 138/64       1901 -  0700  In: 360 [P.O.:360]  Out: -       General Appearance: Well developed, well nourished, no acute distress. Ears/Nose/Mouth/Throat:   Normal MM; anicteric. JVP: WNL   Resp:   Lungs clear to auscultation bilaterally. Nl resp effort. Cardiovascular:  RRR, S1, S2 normal, no new murmur. No gallop or rub. Abdomen:   Soft, non-tender, bowel sounds are present.    Extremities: No edema bilaterally. Skin:  Neuro: Warm and dry.   A/O x3, grossly nonfocal                         Data Review:     Telemetry independently reviewed :   NSR     ECG independently reviewed: NSR  Labs:   Recent Results (from the past 24 hour(s))   CULTURE, BLOOD, PAIRED    Collection Time: 02/02/23  3:25 AM    Specimen: Blood   Result Value Ref Range    Special Requests: NO SPECIAL REQUESTS      Culture result: NO GROWTH AFTER 2 HOURS        Current medications reviewed       Nazario Holloway MD

## 2023-02-02 NOTE — PROCEDURES
Procedure:  TRANSESOPHAGEAL ECHO    Indication: concern for MV vegetation, TIA    Description:  The patient  was brought to the holding area in a fasting state. Both benefits and risks of the procedure were explained in detail to the patient. Patient understands the risks of major complications -- Serious complications including death, sustained ventricular tachycardia, and severe angina have been estimated at less than 1 in 5000. There is 1 in 10,000 risk of perforationMethemoglobinemia is very rare. Two in 3000 cased serious complications related to patient sedation  Minor complications -- Minor complications are seen in fewer than 1 in 500 examinations. These include transient bronchospasm, transient hypoxia, nonsustained ventricular tachycardia, transient atrial fibrillation, minimal hemoptysis, vomiting, or pharyngeal hematoma. The oropharynx was locally anesthesized using benzocaine spray. Patient received anesthesia via anesthesia team  Insertion of the probe was uneventful. Patient tolerated the procedure well, there were no complications. FINDINGS  1. Normal LVEF  2. Thickened mitral valve anterior leaflet but no echodensity which would be felt to be vegetation  3. No other significant valvulopathies  4. No ANEESH thrombus  5.  Atrial septal aneurysm with no evidence of interatrial shunting

## 2023-02-02 NOTE — ANESTHESIA POSTPROCEDURE EVALUATION
* No procedures listed *. MAC    Anesthesia Post Evaluation      Multimodal analgesia: multimodal analgesia used between 6 hours prior to anesthesia start to PACU discharge  Patient location during evaluation: PACU  Level of consciousness: awake  Pain management: adequate  Airway patency: patent  Anesthetic complications: no  Cardiovascular status: acceptable  Respiratory status: acceptable  Hydration status: acceptable  Post anesthesia nausea and vomiting:  none  Final Post Anesthesia Temperature Assessment:  Normothermia (36.0-37.5 degrees C)      INITIAL Post-op Vital signs:   Vitals Value Taken Time   /65 02/02/23 1525   Temp     Pulse 76 02/02/23 1526   Resp 25 02/02/23 1526   SpO2 98 % 02/02/23 1526   Vitals shown include unvalidated device data.

## 2023-02-02 NOTE — PROGRESS NOTES
Transition of Care Plan: home with OP therapy   *ID consulted      RUR: 12% low     PCP F/U: Veronica Figueroa MD     Disposition: home with OP therapy      Transportation: family     [phone]: Discussed in rounds that patient may need IV abx at discharge. ID was consulted.  Will continue to follow    Brent Wang RN, CRM

## 2023-02-02 NOTE — PROGRESS NOTES
[address] [facility] Adult  Hospitalist Group                                                                                          Hospitalist Progress Note  Josseline Melgar MD  Answering service: [phone] OR [phone] from in house phone        Date of Service:  2023  NAME:  Elliot Vazquez  :  1950  MRN:  [id number]      Admission Summary:   \"67year-old woman with past medical history significant for hypertension, anxiety/depression, dyslipidemia, restless legs syndrome presented at the outside facility emergency room with slurred speech and left arm weakness. These symptoms lasted for approximately 30 minutes and occurred one hour before coming to the emergency room. When the patient arrived at the emergency room, code stroke was activated. The CT scan of the head without contrast was obtained. This did not show acute pathology. The CTA of the head and neck was then obtained which shows severe right internal carotid artery stenosis. The patient was transferred to [facility] for further evaluation. The patient has no record of prior admission to this hospital.  The patient denies history of prior stroke. \"     Interval history / Subjective:   Patient is seen and examined   Patient seen by infectious disease--for ELPIDIO ELPIDIO today n.p.o.   Will follow up with the findings and discussed with ID and currently patient is on  aspirin Plavix and statin     Assessment & Plan:     Acute CVA  Right internal carotid artery stenosis  - Presenting with left arm weakness, slurred speech  - MRI brain with multiple small foci of acute infarction throughout the right middle cerebral artery territory  - appreciate neuro input   - CTA brain with severe right internal carotid artery stenosis  - neuroIR consulted and plan for stent placement as outpt   - Continue statin, Plavix, ASA    Mitral valve vegetation   - Echo with 1.9 x 1.4 cm moderately sized vegetation that is mobile and irregular on the anterior leaflet. - Cardiology consult pending for 2 days now - will reach out again today   - BCX:NGTD  - ID consult placed. Discussed with Dr. Dena Henson - dc ELPIDIO  - NPO after MN for possible ELPIDIO today  by cardiology   - c/w empirically on IV ceftriaxone for now     Acute cystitis ruled out  - urine cx neg     Acute renal insufficiency:   - unknown baseline,   - monitor renal function     HTN  - Holding home meds for now    HLD  - continue statin     CAD s/p stent   - plavix    Restless leg- sinemet  Anxiety depression- wellbutrin, lexapro, abilify   Anemia- stable, monitor      Code status: full   Prophylaxis: lovenox  Care Plan discussed with: patient, neuro  Anticipated Disposition: TBD, >48 hours now awaiting cardiology workup     Hospital Problems  Date Reviewed: 1/30/2023            Codes Class Noted POA    * (Principal) Acute CVA (cerebrovascular accident) [facility]) ICD-10-CM: I63.9  ICD-9-CM: 434.91  1/30/2023 Yes        Internal carotid artery stenosis, right ICD-10-CM: V37.87  ICD-9-CM: 433.10  1/29/2023 Unknown         Review of Systems:   A comprehensive review of systems was negative except for that written in the HPI. Vital Signs:    Last 24hrs VS reviewed since prior progress note. Most recent are:  Visit Vitals  /64   Pulse 95   Temp 98 °F (36.7 °C)   Resp 15   Ht 5' 5\" (1.651 m)   Wt 76.7 kg (169 lb)   SpO2 95%   BMI 28.12 kg/m²       No intake or output data in the 24 hours ending 02/02/23 1317       Physical Examination:     I had a face to face encounter with this patient and independently examined them on 2/2/2023 as outlined below:          Constitutional:  No acute distress, cooperative, pleasant    ENT:  Oral mucosa moist, oropharynx benign. Resp:  CTA bilaterally. No wheezing/rhonchi/rales. No accessory muscle use. CV:  Regular rhythm, normal rate, no murmurs, gallops, rubs    GI:  Soft, non distended, non tender.  normoactive bowel sounds    Musculoskeletal:  No edema, warm, 2+ pulses throughout    Neurologic:  Moves all extremities. AAOx3, CN II-XII reviewed            Data Review:    Review and/or order of clinical lab test  Review and/or order of tests in the radiology section of CPT  Review and/or order of tests in the medicine section of [facility]      Labs:     Recent Labs     02/01/23  0553 01/31/23  0647   WBC 8.3 6.1   HGB 11.5 11.4*   HCT 34.8* 34.9*    275       Recent Labs     02/01/23 0553 01/31/23  0647    140   K 3.8 3.6    108   CO2 26 25   BUN 12 11   CREA 1.14* 1.08*   GLU 99 110*   CA 8.9 8.8   MG  --  2.3   PHOS  --  4.1       No results for input(s): ALT, AP, TBIL, TBILI, TP, ALB, GLOB, GGT, AML, LPSE in the last 72 hours. No lab exists for component: SGOT, GPT, AMYP, HLPSE    No results for input(s): INR, PTP, APTT, INREXT, INREXT in the last 72 hours. No results for input(s): FE, TIBC, PSAT, FERR in the last 72 hours. Lab Results   Component Value Date/Time    Folate 11.5 01/30/2023 06:30 AM        No results for input(s): PH, PCO2, PO2 in the last 72 hours. No results for input(s): CPK, CKNDX, TROIQ in the last 72 hours.     No lab exists for component: CPKMB  Lab Results   Component Value Date/Time    Cholesterol, total 191 01/30/2023 04:09 AM    HDL Cholesterol 38 01/30/2023 04:09 AM    LDL, calculated 105.4 (H) 01/30/2023 04:09 AM    Triglyceride 238 (H) 01/30/2023 04:09 AM    CHOL/HDL Ratio 5.0 01/30/2023 04:09 AM     Lab Results   Component Value Date/Time    Glucose (POC) 107 (H) 01/29/2023 07:34 PM    Glucose (POC) 78 08/10/2013 03:12 PM     Lab Results   Component Value Date/Time    Color Yellow/Straw 01/29/2023 08:09 PM    Appearance Hazy (A) 01/29/2023 08:09 PM    Specific gravity 1.010 01/29/2023 08:09 PM    pH (UA) 5.0 01/29/2023 08:09 PM    Protein Negative 01/29/2023 08:09 PM    Glucose Negative 01/29/2023 08:09 PM    Ketone Negative 01/29/2023 08:09 PM    Bilirubin Negative 01/29/2023 08:09 PM    Urobilinogen 0.1 (L) 01/29/2023 08:09 PM    Nitrites Positive (A) 01/29/2023 08:09 PM    Leukocyte Esterase Large (A) 01/29/2023 08:09 PM    Epithelial cells Many (A) 01/29/2023 08:09 PM    Bacteria 1+ (A) 01/29/2023 08:09 PM    WBC >100 (H) 01/29/2023 08:09 PM    RBC 10-20 01/29/2023 08:09 PM         Medications Reviewed:     Current Facility-Administered Medications   Medication Dose Route Frequency    ARIPiprazole (ABILIFY) tablet 15 mg  15 mg Oral DAILY    buPROPion SR (WELLBUTRIN SR) tablet 150 mg  150 mg Oral QHS    carbidopa-levodopa (SINEMET)  mg per tablet 1 Tablet  1 Tablet Oral DAILY    clopidogreL (PLAVIX) tablet 75 mg  75 mg Oral DAILY    escitalopram oxalate (LEXAPRO) tablet 20 mg  20 mg Oral DAILY    pantoprazole (PROTONIX) tablet 40 mg  40 mg Oral ACB    acetaminophen (TYLENOL) tablet 650 mg  650 mg Oral Q4H PRN    Or    acetaminophen (TYLENOL) solution 650 mg  650 mg Per NG tube Q4H PRN    Or    acetaminophen (TYLENOL) suppository 650 mg  650 mg Rectal Q4H PRN    ondansetron (ZOFRAN) injection 4 mg  4 mg IntraVENous Q6H PRN    aspirin chewable tablet 81 mg  81 mg Oral DAILY    bisacodyL (DULCOLAX) tablet 5 mg  5 mg Oral DAILY PRN    enoxaparin (LOVENOX) injection 40 mg  40 mg SubCUTAneous Q24H    buPROPion SR (WELLBUTRIN SR) tablet 300 mg  300 mg Oral DAILY    atorvastatin (LIPITOR) tablet 40 mg  40 mg Oral QHS    melatonin tablet 3 mg  3 mg Oral QHS PRN     ______________________________________________________________________  EXPECTED LENGTH OF STAY: 4d 12h  ACTUAL LENGTH OF STAY:          3                 Bri Del Valle MD

## 2023-02-02 NOTE — PROGRESS NOTES
Problem: Falls - Risk of  Goal: *Absence of Falls  Description: Document Keysha Altman Fall Risk and appropriate interventions in the flowsheet.   Outcome: Progressing Towards Goal  Note: Fall Risk Interventions:  Mobility Interventions: Assess mobility with egress test, Communicate number of staff needed for ambulation/transfer, Patient to call before getting OOB, PT Consult for mobility concerns, PT Consult for assist device competence, Strengthening exercises (ROM-active/passive)         Medication Interventions: Assess postural VS orthostatic hypotension, Patient to call before getting OOB, Teach patient to arise slowly    Elimination Interventions: Bed/chair exit alarm, Call light in reach, Patient to call for help with toileting needs, Stay With Me (per policy)              Problem: TIA/CVA Stroke: Day 2 Until Discharge  Goal: Activity/Safety  Outcome: Progressing Towards Goal  Goal: Diagnostic Test/Procedures  Outcome: Progressing Towards Goal  Goal: Nutrition/Diet  Outcome: Progressing Towards Goal  Goal: Discharge Planning  Outcome: Progressing Towards Goal  Goal: Medications  Outcome: Progressing Towards Goal  Goal: Respiratory  Outcome: Progressing Towards Goal  Goal: Treatments/Interventions/Procedures  Outcome: Progressing Towards Goal  Goal: Psychosocial  Outcome: Progressing Towards Goal  Goal: *Verbalizes anxiety and depression are reduced or absent  Outcome: Progressing Towards Goal  Goal: *Absence of aspiration  Outcome: Progressing Towards Goal  Goal: *Absence of deep venous thrombosis signs and symptoms(Stroke Metric)  Outcome: Progressing Towards Goal  Goal: *Optimal pain control at patient's stated goal  Outcome: Progressing Towards Goal  Goal: *Tolerating diet  Outcome: Progressing Towards Goal  Goal: *Ability to perform ADLs and demonstrates progressive mobility and function  Outcome: Progressing Towards Goal  Goal: *Stroke education continued(Stroke Metric)  Outcome: Progressing Towards Goal

## 2023-02-03 VITALS
HEART RATE: 77 BPM | WEIGHT: 169 LBS | BODY MASS INDEX: 28.16 KG/M2 | OXYGEN SATURATION: 96 % | DIASTOLIC BLOOD PRESSURE: 69 MMHG | TEMPERATURE: 98.1 F | RESPIRATION RATE: 14 BRPM | SYSTOLIC BLOOD PRESSURE: 134 MMHG | HEIGHT: 65 IN

## 2023-02-03 PROCEDURE — 74011250637 HC RX REV CODE- 250/637: Performed by: INTERNAL MEDICINE

## 2023-02-03 PROCEDURE — 74011250636 HC RX REV CODE- 250/636: Performed by: INTERNAL MEDICINE

## 2023-02-03 RX ORDER — ATORVASTATIN CALCIUM 40 MG/1
40 TABLET, FILM COATED ORAL
Qty: 30 TABLET | Refills: 0 | Status: SHIPPED | OUTPATIENT
Start: 2023-02-03 | End: 2023-03-05

## 2023-02-03 RX ORDER — GUAIFENESIN 100 MG/5ML
81 LIQUID (ML) ORAL DAILY
Qty: 30 TABLET | Refills: 0 | Status: SHIPPED | OUTPATIENT
Start: 2023-02-03 | End: 2023-03-05

## 2023-02-03 RX ADMIN — CARBIDOPA AND LEVODOPA 1 TABLET: 25; 100 TABLET ORAL at 10:36

## 2023-02-03 RX ADMIN — ESCITALOPRAM OXALATE 20 MG: 10 TABLET ORAL at 10:37

## 2023-02-03 RX ADMIN — CLOPIDOGREL BISULFATE 75 MG: 75 TABLET ORAL at 10:36

## 2023-02-03 RX ADMIN — ENOXAPARIN SODIUM 40 MG: 100 INJECTION SUBCUTANEOUS at 07:05

## 2023-02-03 RX ADMIN — PANTOPRAZOLE SODIUM 40 MG: 40 TABLET, DELAYED RELEASE ORAL at 07:05

## 2023-02-03 RX ADMIN — ASPIRIN 81 MG: 81 TABLET, CHEWABLE ORAL at 10:36

## 2023-02-03 RX ADMIN — BUPROPION HYDROCHLORIDE 300 MG: 150 TABLET, EXTENDED RELEASE ORAL at 10:36

## 2023-02-03 NOTE — PROGRESS NOTES
Transition of Care Plan: home with OP therapy      RUR: 12% low     PCP F/U: Karma Heredia MD     Disposition: home with OP therapy      Transportation: family    0885: Discharge orders noted. Patient to discharge home with family. Gregor Liang RN, CRM    Medicare pt has received, reviewed, and signed 2nd IM letter informing them of their right to appeal the discharge. Signed copy has been placed on pt bedside chart. The program assesses the family and/or caregiver's readiness, willingness, and ability to provide or support and self-management activities for the patient as needed.

## 2023-02-03 NOTE — DISCHARGE INSTRUCTIONS
Discharge Instructions       PATIENT ID: Gene Benitez  MRN: [id number]   YOB: 1950    DATE OF ADMISSION: [unfilled]    DATE OF DISCHARGE: 2/3/2023    PRIMARY CARE PROVIDER: @PCP@     ATTENDING PHYSICIAN: [unfilled]  DISCHARGING PROVIDER: Amando Ward MD    To contact this individual call [phone] and ask the  to page. If unavailable ask to be transferred the Adult Hospitalist Department. DISCHARGE DIAGNOSES Acute CVA  Right internal carotid artery stenosis    CONSULTATIONS: [unfilled]    PROCEDURES/SURGERIES: * No surgery found *    PENDING TEST RESULTS:   At the time of discharge the following test results are still pending:     FOLLOW UP APPOINTMENTS:   [unfilled]     ADDITIONAL CARE RECOMMENDATIONS:     DIET: Cardiac Diet    ACTIVITY: Activity as tolerated    WOUND CARE:     EQUIPMENT needed:       Radiology      DISCHARGE MEDICATIONS:   See Medication Reconciliation Form    It is important that you take the medication exactly as they are prescribed. Keep your medication in the bottles provided by the pharmacist and keep a list of the medication names, dosages, and times to be taken in your wallet. Do not take other medications without consulting your doctor. NOTIFY YOUR PHYSICIAN FOR ANY OF THE FOLLOWING:   Fever over 101 degrees for 24 hours. Chest pain, shortness of breath, fever, chills, nausea, vomiting, diarrhea, change in mentation, falling, weakness, bleeding. Severe pain or pain not relieved by medications. Or, any other signs or symptoms that you may have questions about.       DISPOSITION:  x  Home With:   OT x PT x HH  RN       SNF/Inpatient Rehab/LTAC    Independent/assisted living    Hospice    Other:     CDMP Checked:   Yes x     PROBLEM LIST Updated:  Yes x       Signed:   Amando Ward MD  2/3/2023  9:43 AM

## 2023-02-03 NOTE — PROGRESS NOTES
NIS brief note:    Rounded on patient to discuss follow up. Aware of appt 2/6 at 1:30pm. Has no concerns. [id number] [address]or [facility].        Harsh Jones, NP

## 2023-02-03 NOTE — PROGRESS NOTES
ELPIDIO showing no MV vegetation. ID will sign off now, please recall as needed.            Eulogio Banegas MD  Infectious Diseases

## 2023-02-03 NOTE — PROGRESS NOTES
NIS Brief Note    ELPIDIO results reviewed  Cont asa, plavix    Pt has appt Monday to discuss carotid intervention

## 2023-02-03 NOTE — PROGRESS NOTES
Problem: Pressure Injury - Risk of  Goal: *Prevention of pressure injury  Description: Document Juwan Scale and appropriate interventions in the flowsheet. Outcome: Resolved/Met  Note: Pressure Injury Interventions:  Sensory Interventions: Assess changes in LOC, Discuss PT/OT consult with provider    Moisture Interventions: Absorbent underpads    Activity Interventions: PT/OT evaluation, Pressure redistribution bed/mattress(bed type)    Mobility Interventions: Assess need for specialty bed, PT/OT evaluation, Pressure redistribution bed/mattress (bed type)    Nutrition Interventions: Document food/fluid/supplement intake    Friction and Shear Interventions: HOB 30 degrees or less, Foam dressings/transparent film/skin sealants                Problem: Patient Education: Go to Patient Education Activity  Goal: Patient/Family Education  Outcome: Resolved/Met     Problem: Falls - Risk of  Goal: *Absence of Falls  Description: Document Daniel Fall Risk and appropriate interventions in the flowsheet.   Outcome: Resolved/Met  Note: Fall Risk Interventions:  Mobility Interventions: Assess mobility with egress test, Communicate number of staff needed for ambulation/transfer, Patient to call before getting OOB, PT Consult for mobility concerns, PT Consult for assist device competence, Strengthening exercises (ROM-active/passive)         Medication Interventions: Assess postural VS orthostatic hypotension, Patient to call before getting OOB, Teach patient to arise slowly    Elimination Interventions: Bed/chair exit alarm, Call light in reach, Patient to call for help with toileting needs, Stay With Me (per policy)              Problem: Patient Education: Go to Patient Education Activity  Goal: Patient/Family Education  Outcome: Resolved/Met     Problem: Patient Education: Go to Patient Education Activity  Goal: Patient/Family Education  Outcome: Resolved/Met     Problem: TIA/CVA Stroke: 0-24 hours  Goal: Off Pathway (Use only if patient is Off Pathway)  Outcome: Resolved/Met  Goal: Activity/Safety  Outcome: Resolved/Met  Goal: Consults, if ordered  Outcome: Resolved/Met  Goal: Diagnostic Test/Procedures  Outcome: Resolved/Met  Goal: Nutrition/Diet  Outcome: Resolved/Met  Goal: Discharge Planning  Outcome: Resolved/Met  Goal: Medications  Outcome: Resolved/Met  Goal: Respiratory  Outcome: Resolved/Met  Goal: Treatments/Interventions/Procedures  Outcome: Resolved/Met  Goal: Minimize risk of bleeding post-thrombolytic infusion  Outcome: Resolved/Met  Goal: Monitor for complications post-thrombolytic infusion  Outcome: Resolved/Met  Goal: Psychosocial  Outcome: Resolved/Met  Goal: *Hemodynamically stable  Outcome: Resolved/Met  Goal: *Neurologically stable  Description: Absence of additional neurological deficits    Outcome: Resolved/Met  Goal: *Verbalizes anxiety and depression are reduced or absent  Outcome: Resolved/Met  Goal: *Absence of Signs of Aspiration on Current Diet  Outcome: Resolved/Met  Goal: *Absence of deep venous thrombosis signs and symptoms(Stroke Metric)  Outcome: Resolved/Met  Goal: *Ability to perform ADLs and demonstrates progressive mobility and function  Outcome: Resolved/Met  Goal: *Stroke education started(Stroke Metric)  Outcome: Resolved/Met  Goal: *Dysphagia screen performed(Stroke Metric)  Outcome: Resolved/Met  Goal: *Rehab consulted(Stroke Metric)  Outcome: Resolved/Met     Problem: TIA/CVA Stroke: Day 2 Until Discharge  Goal: Off Pathway (Use only if patient is Off Pathway)  Outcome: Resolved/Met  Goal: Activity/Safety  Outcome: Resolved/Met  Goal: Diagnostic Test/Procedures  Outcome: Resolved/Met  Goal: Nutrition/Diet  Outcome: Resolved/Met  Goal: Discharge Planning  Outcome: Resolved/Met  Goal: Medications  Outcome: Resolved/Met  Goal: Respiratory  Outcome: Resolved/Met  Goal: Treatments/Interventions/Procedures  Outcome: Resolved/Met  Goal: Psychosocial  Outcome: Resolved/Met  Goal: *Verbalizes anxiety and depression are reduced or absent  Outcome: Resolved/Met  Goal: *Absence of aspiration  Outcome: Resolved/Met  Goal: *Absence of deep venous thrombosis signs and symptoms(Stroke Metric)  Outcome: Resolved/Met  Goal: *Optimal pain control at patient's stated goal  Outcome: Resolved/Met  Goal: *Tolerating diet  Outcome: Resolved/Met  Goal: *Ability to perform ADLs and demonstrates progressive mobility and function  Outcome: Resolved/Met  Goal: *Stroke education continued(Stroke Metric)  Outcome: Resolved/Met     Problem: Ischemic Stroke: Discharge Outcomes  Goal: *Verbalizes anxiety and depression are reduced or absent  Outcome: Resolved/Met  Goal: *Verbalize understanding of risk factor modification(Stroke Metric)  Outcome: Resolved/Met  Goal: *Hemodynamically stable  Outcome: Resolved/Met  Goal: *Absence of aspiration pneumonia  Outcome: Resolved/Met  Goal: *Aware of needed dietary changes  Outcome: Resolved/Met  Goal: *Verbalize understanding of prescribed medications including anti-coagulants, anti-lipid, and/or anti-platelets(Stroke Metric)  Outcome: Resolved/Met  Goal: *Tolerating diet  Outcome: Resolved/Met  Goal: *Aware of follow-up diagnostics related to anticoagulants  Outcome: Resolved/Met  Goal: *Ability to perform ADLs and demonstrates progressive mobility and function  Outcome: Resolved/Met  Goal: *Absence of DVT(Stroke Metric)  Outcome: Resolved/Met  Goal: *Absence of aspiration  Outcome: Resolved/Met  Goal: *Optimal pain control at patient's stated goal  Outcome: Resolved/Met  Goal: *Home safety concerns addressed  Outcome: Resolved/Met  Goal: *Describes available resources and support systems  Outcome: Resolved/Met  Goal: *Verbalizes understanding of activation of EMS(911) for stroke symptoms(Stroke Metric)  Outcome: Resolved/Met  Goal: *Understands and describes signs and symptoms to report to providers(Stroke Metric)  Outcome: Resolved/Met  Goal: *Neurolgocially stable (absence of additional neurological deficits)  Outcome: Resolved/Met  Goal: *Verbalizes importance of follow-up with primary care physician(Stroke Metric)  Outcome: Resolved/Met  Goal: *Smoking cessation discussed,if applicable(Stroke Metric)  Outcome: Resolved/Met  Goal: *Depression screening completed(Stroke Metric)  Outcome: Resolved/Met

## 2023-02-03 NOTE — DISCHARGE SUMMARY
Discharge Summary       PATIENT ID: Felisha Thacker  MRN: [id number]   YOB: 1950    DATE OF ADMISSION: 1/30/2023  1:33 AM    DATE OF DISCHARGE: 2/3/23   PRIMARY CARE PROVIDER: Felipa Strong MD     ATTENDING PHYSICIAN: Reilly Solares  DISCHARGING PROVIDER: Milagros Lay MD    To contact this individual call [phone] and ask the  to page. If unavailable ask to be transferred the Adult Hospitalist Department. CONSULTATIONS: IP CONSULT TO NEUROLOGY  IP CONSULT TO NEUROINTERVENTIONAL SURGERY  IP CONSULT TO INFECTIOUS DISEASES  IP CONSULT TO CARDIOLOGY    PROCEDURES/SURGERIES: * No surgery found *    DISCHARGE DIAGNOSES:  acute CVA    79-year-old woman with past medical history significant for hypertension, anxiety/depression, dyslipidemia, restless legs syndrome presented at the outside facility emergency room with slurred speech and left arm weakness. These symptoms lasted for approximately 30 minutes and occurred one hour before coming to the emergency room. When the patient arrived at the emergency room, code stroke was activated. The CT scan of the head without contrast was obtained. This did not show acute pathology. The CTA of the head and neck was then obtained which shows severe right internal carotid artery stenosis. The patient was transferred to [facility] for further evaluation.   The patient has no record of prior admission to this hospital.  The patient denies history of prior stroke    [address]:     Acute CVA  Right internal carotid artery stenosis  - Presenting with left arm weakness, slurred speech  - MRI brain with multiple small foci of acute infarction throughout the right middle cerebral artery territory  - appreciate neuro input   - CTA brain with severe right internal carotid artery stenosis  - neuroIR consulted and plan for stent placement as outpt   - Continue statin, Plavix, ASA     Mitral valve vegetation   - Echo with 1.9 x 1.4 cm moderately sized vegetation that is mobile and irregular on the anterior leaflet. - Cardiology consult - ELPIDIO- NO vegetations seen   - BCX:NGTD  - ID consult placed. Discussed with Dr. Claudean Hutchinson - signed off      Acute cystitis ruled out  - urine cx neg      Acute renal insufficiency:   - unknown baseline,   - monitor renal function      HTN  - Holding home meds for now     HLD  - continue statin      CAD s/p stent   - plavix     Restless leg- sinemet  Anxiety depression- wellbutrin, lexapro, abilify   Anemia- stable, monitor        DISCHARGE DIAGNOSES / PLAN:      D/c home    BMI: Body mass index is 28.12 kg/m². . This patient: Meets criteria for overweight given BMI >/= 25 and < 30 due to excess calories/nutritional. Weight loss and lifestyle modifications should be encouraged as an outpatient. PENDING TEST RESULTS:   At the time of discharge the following test results are still pending:      ADDITIONAL CARE RECOMMENDATIONS:        NOTIFY YOUR PHYSICIAN FOR ANY OF THE FOLLOWING:   Fever over 101 degrees for 24 hours. Chest pain, shortness of breath, fever, chills, nausea, vomiting, diarrhea, change in mentation, falling, weakness, bleeding. Severe pain or pain not relieved by medications, as well as any other signs or symptoms that you may have questions about.     FOLLOW UP APPOINTMENTS:    Follow-up Information       Follow up With Specialties Details Why Contact Marielos Montesinos DO Endovascular Surgical Neuroradiology Schedule an appointment as soon as possible for a visit in 2 week(s) follow up with Dr. Geoff Bobo within 2 weeks for right ICA stenosis [address] [facility] [address] [place] [address]      Jessica Muñiz MD Family Medicine Follow up in 1 week(s)  [address] [phone]                 DIET: Cardiac Diet    ACTIVITY: Activity as tolerated    EQUIPMENT needed:     DISCHARGE MEDICATIONS:  Current Discharge Medication List        START taking these medications    Details   aspirin 81 mg chewable tablet Take 1 Tablet by mouth daily for 30 days. Qty: 30 Tablet, Refills: 0  Start date: 2/3/2023, End date: 3/5/2023      atorvastatin (LIPITOR) 40 mg tablet Take 1 Tablet by mouth nightly for 30 days. Qty: 30 Tablet, Refills: 0  Start date: 2/3/2023, End date: 3/5/2023           CONTINUE these medications which have NOT CHANGED    Details   !! buPROPion SR (WELLBUTRIN SR) 150 mg SR tablet Take 150 mg by mouth nightly. clopidogreL (PLAVIX) 75 mg tab Take 75 mg by mouth daily. chlorthalidone (HYGROTON) 25 mg tablet Take 25 mg by mouth daily. ARIPiprazole (ABILIFY) 15 mg tablet       !! buPROPion SR (WELLBUTRIN SR) 150 mg SR tablet Take 300 mg by mouth daily. carbidopa-levodopa (SINEMET)  mg per tablet Take 1 Tablet by mouth nightly. ascorbic acid, vitamin C, (VITAMIN C) 500 mg tablet 500 mg nightly. turmeric root extract 500 mg cap 1 tablet      vitamins a & d cap 1 capsule      escitalopram oxalate (LEXAPRO) 20 mg tablet Take 20 mg by mouth daily. esomeprazole (NEXIUM) 40 mg capsule Take 40 mg by mouth Daily (before breakfast). Indications: gastroesophageal reflux disease      metoprolol tartrate (LOPRESSOR) 25 mg tablet Take 25 mg by mouth two (2) times a day. cyclobenzaprine (FLEXERIL) 10 mg tablet Take 10 mg by mouth two (2) times daily as needed for Muscle Spasm(s) (back pain). diclofenac (VOLTAREN) 1 % gel Apply 2 g to affected area two (2) times daily as needed for Pain (to knees and feet as needed for pain). ergocalciferol (ERGOCALCIFEROL) 1,250 mcg (50,000 unit) capsule Take 50,000 Units by mouth every Monday and Thursday. ferrous sulfate 325 mg (65 mg iron) tablet Take 325 mg by mouth daily (with breakfast). gabapentin (NEURONTIN) 600 mg tablet Take 600 mg by mouth daily as needed for Pain (BACK PAIN). prochlorperazine (COMPAZINE) 5 mg tablet 1-2 tabs po every 6 hours prn nausea  Qty: 40 Tab, Refills: 0      traMADol (ULTRAM) 50 mg tablet Take 1 Tab by mouth every six (6) hours as needed for Pain. Qty: 30 Tab, Refills: 0       !! - Potential duplicate medications found. Please discuss with provider. STOP taking these medications       pravastatin (PRAVACHOL) 20 mg tablet Comments:   Reason for Stopping:               DISPOSITION:   x Home With:   OT x PT x HH  RN       Long term SNF/Inpatient Rehab    Independent/assisted living    Hospice    Other:       PATIENT CONDITION AT DISCHARGE:     Functional status    Poor    x Deconditioned     Independent      Cognition    x Lucid     Forgetful     Dementia      Catheters/lines (plus indication)    Zarate     PICC     PEG    x None      Code status   x  Full code     DNR      PHYSICAL EXAMINATION AT DISCHARGE:  General:          Alert, cooperative, no distress, appears stated age. HEENT:           Atraumatic, anicteric sclerae, pink conjunctivae                          No oral ulcers, mucosa moist, throat clear, dentition fair  Neck:               Supple, symmetrical  Lungs:             Clear to auscultation bilaterally. No Wheezing or Rhonchi. No rales. Chest wall:      No tenderness  No Accessory muscle use. Heart:              Regular  rhythm,  No  murmur   No edema  Abdomen:        Soft, non-tender. Not distended. Bowel sounds normal  Extremities:     No cyanosis. No clubbing,                            Skin turgor normal, Capillary refill normal  Skin:                Not pale. Not Jaundiced  No rashes   Psych:             Not anxious or agitated.   Neurologic:      Alert, moves all extremities, answers questions appropriately and responds to commands       CHRONIC MEDICAL DIAGNOSES:  Problem List as of 2/3/2023 Date Reviewed: 1/30/2023            Codes Class Noted - Resolved    * (Principal) Acute CVA (cerebrovascular accident) ([facility] [address] ICD-10-CM: I63.9  ICD-9-CM: 434.91  1/30/2023 - Present        Internal carotid artery stenosis, right ICD-10-CM: A72.94  ICD-9-CM: 433.10  1/29/2023 - Present           Greater than 30  minutes were spent with the patient on counseling and coordination of care    Signed:   Lucina Silva MD  2/3/2023  9:47 AM

## 2023-02-05 LAB
BACTERIA SPEC CULT: NORMAL
BACTERIA SPEC CULT: NORMAL
SERVICE CMNT-IMP: NORMAL
SERVICE CMNT-IMP: NORMAL

## 2023-02-06 ENCOUNTER — OFFICE VISIT (OUTPATIENT)
Dept: NEUROSURGERY | Age: 73
End: 2023-02-06
Payer: MEDICARE

## 2023-02-06 VITALS
OXYGEN SATURATION: 97 % | HEART RATE: 75 BPM | HEIGHT: 65 IN | BODY MASS INDEX: 28.12 KG/M2 | TEMPERATURE: 96.3 F | DIASTOLIC BLOOD PRESSURE: 72 MMHG | SYSTOLIC BLOOD PRESSURE: 120 MMHG

## 2023-02-06 DIAGNOSIS — I65.21 INTERNAL CAROTID ARTERY STENOSIS, RIGHT: Primary | ICD-10-CM

## 2023-02-06 DIAGNOSIS — I63.9 ACUTE CVA (CEREBROVASCULAR ACCIDENT) (HCC): Primary | ICD-10-CM

## 2023-02-06 PROCEDURE — 1090F PRES/ABSN URINE INCON ASSESS: CPT | Performed by: PSYCHIATRY & NEUROLOGY

## 2023-02-06 PROCEDURE — 1123F ACP DISCUSS/DSCN MKR DOCD: CPT | Performed by: PSYCHIATRY & NEUROLOGY

## 2023-02-06 PROCEDURE — G8432 DEP SCR NOT DOC, RNG: HCPCS | Performed by: PSYCHIATRY & NEUROLOGY

## 2023-02-06 PROCEDURE — G8400 PT W/DXA NO RESULTS DOC: HCPCS | Performed by: PSYCHIATRY & NEUROLOGY

## 2023-02-06 PROCEDURE — G8428 CUR MEDS NOT DOCUMENT: HCPCS | Performed by: PSYCHIATRY & NEUROLOGY

## 2023-02-06 PROCEDURE — G8419 CALC BMI OUT NRM PARAM NOF/U: HCPCS | Performed by: PSYCHIATRY & NEUROLOGY

## 2023-02-06 PROCEDURE — 1111F DSCHRG MED/CURRENT MED MERGE: CPT | Performed by: PSYCHIATRY & NEUROLOGY

## 2023-02-06 PROCEDURE — 1101F PT FALLS ASSESS-DOCD LE1/YR: CPT | Performed by: PSYCHIATRY & NEUROLOGY

## 2023-02-06 PROCEDURE — 99204 OFFICE O/P NEW MOD 45 MIN: CPT | Performed by: PSYCHIATRY & NEUROLOGY

## 2023-02-06 PROCEDURE — G8536 NO DOC ELDER MAL SCRN: HCPCS | Performed by: PSYCHIATRY & NEUROLOGY

## 2023-02-06 PROCEDURE — 3017F COLORECTAL CA SCREEN DOC REV: CPT | Performed by: PSYCHIATRY & NEUROLOGY

## 2023-02-06 NOTE — PROGRESS NOTES
NEUROINTERVENTIONAL SURGERY   New Patient Visit         CONSULT INFORMATION:  Date of Service: 2023  Consultation Requested by: Melania Bal    PATIENT IDENTIFICATION:  Patient Name: Celestino Singleton  : 1950      CC: stroke    HISTORY OF PRESENT ILLNESS:  67 y.o. RH WHITE/NON- [6]female referred for stroke. Was admitted recently for left sided weakness. MRI showed scatterred right MCA strokes CTA showed severe right ICA stenosis. She had a possible MV vegetation on TTE but subsequent ELPIDIO was normal. She has had anemia in the past but work up unremarkable. H/H has been stable. She has been on plavix for CAD. She is now on DAPT, statin. She states she has no weakness but at times off balance. No falls. Past Medical History:   Diagnosis Date    CAD (coronary artery disease)     KUSH placed    Depression     HTN (hypertension)     Stroke (cerebrum) ([facility] [address] 2023       Past Surgical History:   Procedure Laterality Date    HX CHOLECYSTECTOMY      HX GI      HX GYN      HX ORTHOPAEDIC      AK UNLISTED PROCEDURE CARDIAC SURGERY         Current Outpatient Medications   Medication Sig    aspirin 81 mg chewable tablet Take 1 Tablet by mouth daily for 30 days. atorvastatin (LIPITOR) 40 mg tablet Take 1 Tablet by mouth nightly for 30 days. buPROPion SR (WELLBUTRIN SR) 150 mg SR tablet Take 150 mg by mouth nightly. clopidogreL (PLAVIX) 75 mg tab Take 75 mg by mouth daily. metoprolol tartrate (LOPRESSOR) 25 mg tablet Take 25 mg by mouth two (2) times a day. chlorthalidone (HYGROTON) 25 mg tablet Take 25 mg by mouth daily. ARIPiprazole (ABILIFY) 15 mg tablet     buPROPion SR (WELLBUTRIN SR) 150 mg SR tablet Take 300 mg by mouth daily. carbidopa-levodopa (SINEMET)  mg per tablet Take 1 Tablet by mouth nightly. ascorbic acid, vitamin C, (VITAMIN C) 500 mg tablet 500 mg nightly.     turmeric root extract 500 mg cap 1 tablet    vitamins a & d cap 1 capsule    cyclobenzaprine (FLEXERIL) 10 mg tablet Take 10 mg by mouth two (2) times daily as needed for Muscle Spasm(s) (back pain). diclofenac (VOLTAREN) 1 % gel Apply 2 g to affected area two (2) times daily as needed for Pain (to knees and feet as needed for pain). ergocalciferol (ERGOCALCIFEROL) 1,250 mcg (50,000 unit) capsule Take 50,000 Units by mouth every Monday and Thursday. escitalopram oxalate (LEXAPRO) 20 mg tablet Take 20 mg by mouth daily. esomeprazole (NEXIUM) 40 mg capsule Take 40 mg by mouth Daily (before breakfast). Indications: gastroesophageal reflux disease    ferrous sulfate 325 mg (65 mg iron) tablet Take 325 mg by mouth daily (with breakfast). gabapentin (NEURONTIN) 600 mg tablet Take 600 mg by mouth daily as needed for Pain (BACK PAIN). prochlorperazine (COMPAZINE) 5 mg tablet 1-2 tabs po every 6 hours prn nausea    traMADol (ULTRAM) 50 mg tablet Take 1 Tab by mouth every six (6) hours as needed for Pain. (Patient taking differently: Take 50 mg by mouth three (3) times daily as needed. Indications: pain)     No current facility-administered medications for this visit.        Allergies   Allergen Reactions    Iodinated Contrast Media Hives     Okay with benedryl     Morphine Unknown (comments)         Social History  Social History     Socioeconomic History    Marital status:      Spouse name: Not on file    Number of children: Not on file    Years of education: Not on file    Highest education level: Not on file   Occupational History    Not on file   Tobacco Use    Smoking status: Never    Smokeless tobacco: Never   Substance and Sexual Activity    Alcohol use: Never    Drug use: Never    Sexual activity: Not on file   Other Topics Concern    Not on file   Social History Narrative    Not on file     Social Determinants of Health     Financial Resource Strain: Not on file   Food Insecurity: Not on file   Transportation Needs: Not on file   Physical Activity: Not on file   Stress: Not on file Social Connections: Not on file   Intimate Partner Violence: Not on file   Housing Stability: Not on file     Social History     Substance and Sexual Activity   Drug Use Never     No family history on file.       REVIEW OF SYSTEMS:  Neg except for above      OBJECTIVE:  VSS    Physical Exam:   Gen: NAD  CV: nml s1,s2; no bruit    Neuro:  A/Ox3,speech/lang inact  CN 2-12 intact, perrl, fundoscopic exam nml, VF full  5/5 throughout no drift; SILT, Parietal fxn intact  DTR sym  FTN, gait intact        Lab Review:  Lab Results   Component Value Date    WBC 8.3 02/01/2023    HGB 11.5 02/01/2023    HCT 34.8 (L) 02/01/2023     02/01/2023    CHOL 191 01/30/2023    HDL 38 01/30/2023    ALT 24 01/29/2023    AST 23 01/29/2023     02/01/2023    K 3.8 02/01/2023     02/01/2023    CREA 1.14 (H) 02/01/2023    BUN 12 02/01/2023    CO2 26 02/01/2023    TSH 4.62 (H) 01/30/2023    INR 1.1 01/29/2023       Imaging: my reads  bMRI and CTA as above        IMPRESSION:  Symp right ICA stenosis    PLAN:  - normotension  - cont asa/plavix, statin  - r/b/a d/w pt agrees to DSA poss right BEKA        SUBMITTED BY:  Signed By: Dania Selby DO     Neurointerventional Surgery  [facility]    Available via [address] [place] [address]    February 6, 2023

## 2023-02-06 NOTE — PATIENT INSTRUCTIONS
You will have Cerebral angioplasty and stenting on 2/23/2023 at [facility] 1540 time is 10:00 am at Patient Registration. You will be contacted by Preadmission Testing to schedule an appointment for labs, chest xray, ekg. No eating or drinking after midnight the day prior to procedure and take morning medications the morning of procedure with sips of water. Do not stop taking Blood Thinners if applicable unless notified by this office. You must have a  to drive you home upon discharge. Recommend you have someone with you for at least 24-48 hours post procedure. No metal of glue in hair.

## 2023-02-06 NOTE — PROGRESS NOTES
[facility] [place] follow up presenting with Carotid artery stenosis right. Son at visit with patient. Patient denies headaches, dizziness, numbness or tingling, neck pain or stiffness. Reports loss of balance at times.

## 2023-02-06 NOTE — LETTER
2/6/2023    Patient: Argenis Castorena   YOB: 1950   Date of Visit: 2/6/2023     Felisha Hector MD  [facility] [address] [address]  Via Fax: [phone]    Dear Felisha Hector MD,      Thank you for referring Ms. Rosalio Jeffrey to [address] [place] [address] for evaluation. My notes for this consultation are attached. If you have questions, please do not hesitate to call me. I look forward to following your patient along with you.       Sincerely,    Suad Arias, DO

## 2023-02-06 NOTE — H&P (VIEW-ONLY)
NEUROINTERVENTIONAL SURGERY   New Patient Visit         CONSULT INFORMATION:  Date of Service: 2023  Consultation Requested by: Guillermo Tafoya    PATIENT IDENTIFICATION:  Patient Name: Tammy Pop  : 1950      CC: stroke    HISTORY OF PRESENT ILLNESS:  67 y.o. RH WHITE/NON- [6]female referred for stroke. Was admitted recently for left sided weakness. MRI showed scatterred right MCA strokes CTA showed severe right ICA stenosis. She had a possible MV vegetation on TTE but subsequent ELPIDIO was normal. She has had anemia in the past but work up unremarkable. H/H has been stable. She has been on plavix for CAD. She is now on DAPT, statin. She states she has no weakness but at times off balance. No falls. Past Medical History:   Diagnosis Date    CAD (coronary artery disease)     KUSH placed    Depression     HTN (hypertension)     Stroke (cerebrum) ([facility] [address] 2023       Past Surgical History:   Procedure Laterality Date    HX CHOLECYSTECTOMY      HX GI      HX GYN      HX ORTHOPAEDIC      ND UNLISTED PROCEDURE CARDIAC SURGERY         Current Outpatient Medications   Medication Sig    aspirin 81 mg chewable tablet Take 1 Tablet by mouth daily for 30 days. atorvastatin (LIPITOR) 40 mg tablet Take 1 Tablet by mouth nightly for 30 days. buPROPion SR (WELLBUTRIN SR) 150 mg SR tablet Take 150 mg by mouth nightly. clopidogreL (PLAVIX) 75 mg tab Take 75 mg by mouth daily. metoprolol tartrate (LOPRESSOR) 25 mg tablet Take 25 mg by mouth two (2) times a day. chlorthalidone (HYGROTON) 25 mg tablet Take 25 mg by mouth daily. ARIPiprazole (ABILIFY) 15 mg tablet     buPROPion SR (WELLBUTRIN SR) 150 mg SR tablet Take 300 mg by mouth daily. carbidopa-levodopa (SINEMET)  mg per tablet Take 1 Tablet by mouth nightly. ascorbic acid, vitamin C, (VITAMIN C) 500 mg tablet 500 mg nightly.     turmeric root extract 500 mg cap 1 tablet    vitamins a & d cap 1 capsule    cyclobenzaprine (FLEXERIL) 10 mg tablet Take 10 mg by mouth two (2) times daily as needed for Muscle Spasm(s) (back pain). diclofenac (VOLTAREN) 1 % gel Apply 2 g to affected area two (2) times daily as needed for Pain (to knees and feet as needed for pain). ergocalciferol (ERGOCALCIFEROL) 1,250 mcg (50,000 unit) capsule Take 50,000 Units by mouth every Monday and Thursday. escitalopram oxalate (LEXAPRO) 20 mg tablet Take 20 mg by mouth daily. esomeprazole (NEXIUM) 40 mg capsule Take 40 mg by mouth Daily (before breakfast). Indications: gastroesophageal reflux disease    ferrous sulfate 325 mg (65 mg iron) tablet Take 325 mg by mouth daily (with breakfast). gabapentin (NEURONTIN) 600 mg tablet Take 600 mg by mouth daily as needed for Pain (BACK PAIN). prochlorperazine (COMPAZINE) 5 mg tablet 1-2 tabs po every 6 hours prn nausea    traMADol (ULTRAM) 50 mg tablet Take 1 Tab by mouth every six (6) hours as needed for Pain. (Patient taking differently: Take 50 mg by mouth three (3) times daily as needed. Indications: pain)     No current facility-administered medications for this visit.        Allergies   Allergen Reactions    Iodinated Contrast Media Hives     Okay with benedryl     Morphine Unknown (comments)         Social History  Social History     Socioeconomic History    Marital status:      Spouse name: Not on file    Number of children: Not on file    Years of education: Not on file    Highest education level: Not on file   Occupational History    Not on file   Tobacco Use    Smoking status: Never    Smokeless tobacco: Never   Substance and Sexual Activity    Alcohol use: Never    Drug use: Never    Sexual activity: Not on file   Other Topics Concern    Not on file   Social History Narrative    Not on file     Social Determinants of Health     Financial Resource Strain: Not on file   Food Insecurity: Not on file   Transportation Needs: Not on file   Physical Activity: Not on file   Stress: Not on file Social Connections: Not on file   Intimate Partner Violence: Not on file   Housing Stability: Not on file     Social History     Substance and Sexual Activity   Drug Use Never     No family history on file.       REVIEW OF SYSTEMS:  Neg except for above      OBJECTIVE:  VSS    Physical Exam:   Gen: NAD  CV: nml s1,s2; no bruit    Neuro:  A/Ox3,speech/lang inact  CN 2-12 intact, perrl, fundoscopic exam nml, VF full  5/5 throughout no drift; SILT, Parietal fxn intact  DTR sym  FTN, gait intact        Lab Review:  Lab Results   Component Value Date    WBC 8.3 02/01/2023    HGB 11.5 02/01/2023    HCT 34.8 (L) 02/01/2023     02/01/2023    CHOL 191 01/30/2023    HDL 38 01/30/2023    ALT 24 01/29/2023    AST 23 01/29/2023     02/01/2023    K 3.8 02/01/2023     02/01/2023    CREA 1.14 (H) 02/01/2023    BUN 12 02/01/2023    CO2 26 02/01/2023    TSH 4.62 (H) 01/30/2023    INR 1.1 01/29/2023       Imaging: my reads  bMRI and CTA as above        IMPRESSION:  Symp right ICA stenosis    PLAN:  - normotension  - cont asa/plavix, statin  - r/b/a d/w pt agrees to DSA poss right BEKA        SUBMITTED BY:  Signed By: Farhad Peña DO     Neurointerventional Surgery  [facility]    Available via [address] [place] [address]    February 6, 2023

## 2023-02-07 ENCOUNTER — TELEPHONE (OUTPATIENT)
Dept: NEUROSURGERY | Age: 73
End: 2023-02-07

## 2023-02-07 LAB
BACTERIA SPEC CULT: NORMAL
SERVICE CMNT-IMP: NORMAL

## 2023-02-07 NOTE — TELEPHONE ENCOUNTER
Patient's sister, Lakshmi Collazo, called to speak to [place]. She has a question about her sister's surgery. She said there is no barney to call her back. She realizes the surgery is not until the 23rd.     [facility]#: [phone]

## 2023-02-16 ENCOUNTER — HOSPITAL ENCOUNTER (OUTPATIENT)
Dept: PREADMISSION TESTING | Age: 73
Discharge: HOME | End: 2023-02-16
Payer: MEDICARE

## 2023-02-16 VITALS
WEIGHT: 170 LBS | TEMPERATURE: 97.8 F | SYSTOLIC BLOOD PRESSURE: 164 MMHG | HEIGHT: 65 IN | BODY MASS INDEX: 28.32 KG/M2 | HEART RATE: 60 BPM | DIASTOLIC BLOOD PRESSURE: 83 MMHG

## 2023-02-16 LAB
ASPIRIN TEST, ASPIRN: 465 ARU
P2Y12 PLT RESPONSE,PPPR: 188 PRU (ref 194–418)

## 2023-02-16 PROCEDURE — 36415 COLL VENOUS BLD VENIPUNCTURE: CPT

## 2023-02-16 PROCEDURE — 86900 BLOOD TYPING SEROLOGIC ABO: CPT

## 2023-02-16 PROCEDURE — 85576 BLOOD PLATELET AGGREGATION: CPT

## 2023-02-16 RX ORDER — FLUTICASONE PROPIONATE 50 MCG
2 SPRAY, SUSPENSION (ML) NASAL DAILY
COMMUNITY

## 2023-02-16 RX ORDER — MINERAL OIL
180 ENEMA (ML) RECTAL DAILY
COMMUNITY

## 2023-02-16 RX ORDER — ACETAMINOPHEN 325 MG/1
TABLET ORAL
COMMUNITY

## 2023-02-16 NOTE — PERIOP NOTES
JA'S  PREOPERATIVE INSTRUCTIONS  Neuro Interventional Surgery    Surgery Date:   2/23/23     Your surgeon's office will call you to confirm day of surgery and arrival time. If you do not hear from them, please call [phone] to confirm day of surgery and arrival time. Please report to [facility] Patient Access/Admitting on the 1st floor. Bring your insurance card, photo identification, and any copayment ( if applicable). If you are going home the same day of your surgery, you must have a responsible adult to drive you home. You need to have a responsible adult to stay with you the first 24 hours after surgery and you should not drive a car for 24 hours following your surgery. Nothing to eat or drink after midnight the night before surgery. This includes no water, gum, mints, coffee, juice, etc.  Please note special instructions, if applicable, below for medications. Do NOT drink alcohol or smoke 24 hours before surgery. STOP smoking for 14 days prior as it helps with breathing and healing after surgery. If you are being admitted to the hospital, please leave personal belongings/luggage in your car until you have an assigned hospital room number. Please wear comfortable clothes. Wear your glasses instead of contacts. We ask that all money, jewelry and valuables be left at home. Wear no make up, particularly mascara, the day of surgery. All body piercings, rings, and jewelry need to be removed and left at home. Please remove any nail polish or artifical nails from your fingernails. Please wear your hair loose or down. Please no pony-tails, buns, or any metal hair accessories. If you shower the morning of surgery, please do not apply any lotions or powders afterwards. You may wear deodorant. Do not shave any body area within 24 hours of your surgery. Please follow all instructions to avoid any potential surgical cancellation.   Should your physical condition change, (i.e. fever, cold, flu, etc.) please notify your surgeon as soon as possible. It is important to be on time. If a situation occurs where you may be delayed, please call:  [phone] on the day of surgery. The Preadmission Testing staff can be reached at [phone]. Special instructions: NONE    Current Outpatient Medications   Medication Sig    fexofenadine (ALLEGRA) 180 mg tablet Take 180 mg by mouth daily. fluticasone propionate (Flonase Allergy Relief) 50 mcg/actuation nasal spray 2 Sprays by Both Nostrils route daily. acetaminophen (TylenoL) 325 mg tablet Take  by mouth every four (4) hours as needed for Pain. aspirin 81 mg chewable tablet Take 1 Tablet by mouth daily for 30 days. atorvastatin (LIPITOR) 40 mg tablet Take 1 Tablet by mouth nightly for 30 days. buPROPion SR (WELLBUTRIN SR) 150 mg SR tablet Take 150 mg by mouth nightly. clopidogreL (PLAVIX) 75 mg tab Take 75 mg by mouth daily. metoprolol tartrate (LOPRESSOR) 25 mg tablet Take 25 mg by mouth two (2) times a day. ARIPiprazole (ABILIFY) 15 mg tablet nightly. buPROPion SR (WELLBUTRIN SR) 150 mg SR tablet Take 300 mg by mouth daily. carbidopa-levodopa (SINEMET)  mg per tablet Take 1 Tablet by mouth nightly. FOR RESTLESS LEG SYNDROME    ascorbic acid, vitamin C, (VITAMIN C) 500 mg tablet 500 mg nightly. turmeric root extract 500 mg cap 1 tablet    vitamins a & d cap 1 capsule    cyclobenzaprine (FLEXERIL) 10 mg tablet Take 10 mg by mouth two (2) times daily as needed for Muscle Spasm(s) (back pain). diclofenac (VOLTAREN) 1 % gel Apply 2 g to affected area two (2) times daily as needed for Pain (to knees and feet as needed for pain). ergocalciferol (ERGOCALCIFEROL) 1,250 mcg (50,000 unit) capsule Take 50,000 Units by mouth every Monday and Thursday. escitalopram oxalate (LEXAPRO) 20 mg tablet Take 20 mg by mouth daily. esomeprazole (NEXIUM) 40 mg capsule Take 40 mg by mouth Daily (before breakfast).  Indications: gastroesophageal reflux disease    ferrous sulfate 325 mg (65 mg iron) tablet Take 325 mg by mouth daily (with breakfast). gabapentin (NEURONTIN) 600 mg tablet Take 600 mg by mouth daily as needed for Pain (BACK PAIN). prochlorperazine (COMPAZINE) 5 mg tablet 1-2 tabs po every 6 hours prn nausea    traMADol (ULTRAM) 50 mg tablet Take 1 Tab by mouth every six (6) hours as needed for Pain. No current facility-administered medications for this encounter. YOU MUST ONLY TAKE THESE MEDICATIONS THE MORNING OF SURGERY WITH A SIP OF WATER: Gerardine Friar, METOPROLOL, LEXAPRO, NEXIUM    MEDICATIONS TO TAKE THE MORNING OF SURGERY ONLY IF NEEDED: FLONASE, TYLENOL, GABAPENTIN    HOLD THESE PRESCRIPTION MEDICATIONS BEFORE SURGERY: STOP ALL VITAMINS AND SUPPLEMENTS TODAY    Ask your surgeon/prescribing physician about when/if to STOP taking these medications: FOLLOW DR. Bella Love INSTRUCTIONS FOR PLAVIX AND ASPIRIN    Stop all vitamins, herbal medicines and any non-steroidal anti-inflammatory drugs (i.e. Ibuprofen, Naproxen, Advil, Aleve) 7 days prior to surgery. You may take Tylenol. If you are currently taking Aspirin, Plavix, Brilinta, Coumadin or any other blood-thinning/anticoagulant medication contact your surgeon for instructions. Patient Information Regarding COVID Restrictions    Day of Procedure    Please park in the parking deck or any designated visitor parking lot. Enter the facility through the Main Entrance of the hospital.  On the day of surgery, please provide the cell phone number of the person who will be waiting for you to the Patient Access representative at the time of registration. Masks are highly recommended in the hospital, but not required.   Once your procedure and the immediate recovery period is completed, a nurse in the recovery area will contact your designated visitor to inform them of your room number or to otherwise review other pertinent information regarding your care.    Social distancing practices are strongly encouraged in waiting areas and the cafeteria. The patient was contacted  in person. She verbalized understanding of all instructions does not  need reinforcement.

## 2023-02-17 LAB
ABO + RH BLD: NORMAL
BLOOD GROUP ANTIBODIES SERPL: NORMAL
SPECIMEN EXP DATE BLD: NORMAL

## 2023-02-17 NOTE — PERIOP NOTES
SENT NOTE TO IVETT PICKARD, NURSE FOR NEURO INTERVENTIONAL, SENT VIA CC. .. HI IVETT YEBOAH OUR NP IS OUT OF THE OFFICE TODAY SO I AM JUST NOTIFYING YOU OF LAB RESULTS ON THIS PATIENT. ASPIRIN TEST 465     P2Y12  TEST 188 (L)      SURGERY SCHEDULED FOR 2/23/23. TYPE AND SCREEN EXP 2/25/23.     One Gerardo Cerrato RN  PRE ADMIT TESTING  [address] [place] [address] [phone]

## 2023-02-22 ENCOUNTER — TELEPHONE (OUTPATIENT)
Dept: NEUROSURGERY | Age: 73
End: 2023-02-22

## 2023-02-22 NOTE — PROGRESS NOTES
This RN contacted pt for pre-procedure call. Pt no answer so sister was contacted per chart. Sister states she was told by Siva Telles that procedure was cancelled due to [place] [employer] insurance problem not getting approved\". Pt will be notifiied by angio when she can reschedule.

## 2023-02-23 ENCOUNTER — HOSPITAL ENCOUNTER (OUTPATIENT)
Dept: INTERVENTIONAL RADIOLOGY/VASCULAR | Age: 73
Discharge: HOME | End: 2023-02-23
Attending: PSYCHIATRY & NEUROLOGY

## 2023-02-23 NOTE — TELEPHONE ENCOUNTER
Spoke to sister to inform her that patient's procedure will need to be rescheduled. Authorization was denied by her insurance company. Notice was received the day prior to procedure. Awaiting Peer to Peer  and/or appeal to be arranged. Sister stated she will talk to patient. Informed her I would notify her if procedure is approved with a date and time of procedure. Sister stated understanding.

## 2023-03-02 DIAGNOSIS — I65.21 INTERNAL CAROTID ARTERY STENOSIS, RIGHT: Primary | ICD-10-CM

## 2023-03-04 LAB
ALBUMIN SERPL-MCNC: 4.8 G/DL (ref 3.7–4.7)
ALBUMIN/GLOB SERPL: 2 {RATIO} (ref 1.2–2.2)
ALP SERPL-CCNC: 67 IU/L (ref 44–121)
ALT SERPL-CCNC: 18 IU/L (ref 0–32)
AST SERPL-CCNC: 25 IU/L (ref 0–40)
BILIRUB SERPL-MCNC: 0.5 MG/DL (ref 0–1.2)
BUN SERPL-MCNC: 18 MG/DL (ref 8–27)
BUN/CREAT SERPL: 15 (ref 12–28)
CALCIUM SERPL-MCNC: 9.7 MG/DL (ref 8.7–10.3)
CHLORIDE SERPL-SCNC: 94 MMOL/L (ref 96–106)
CO2 SERPL-SCNC: 24 MMOL/L (ref 20–29)
CREAT SERPL-MCNC: 1.22 MG/DL (ref 0.57–1)
EGFRCR SERPLBLD CKD-EPI 2021: 47 ML/MIN/1.73
ERYTHROCYTE [DISTWIDTH] IN BLOOD BY AUTOMATED COUNT: 13.4 % (ref 11.7–15.4)
GLOBULIN SER CALC-MCNC: 2.4 G/DL (ref 1.5–4.5)
GLUCOSE SERPL-MCNC: 96 MG/DL (ref 70–99)
HCT VFR BLD AUTO: 35.8 % (ref 34–46.6)
HGB BLD-MCNC: 12.2 G/DL (ref 11.1–15.9)
MCH RBC QN AUTO: 30.4 PG (ref 26.6–33)
MCHC RBC AUTO-ENTMCNC: 34.1 G/DL (ref 31.5–35.7)
MCV RBC AUTO: 89 FL (ref 79–97)
PLATELET # BLD AUTO: 336 X10E3/UL (ref 150–450)
POTASSIUM SERPL-SCNC: 3.4 MMOL/L (ref 3.5–5.2)
PROT SERPL-MCNC: 7.2 G/DL (ref 6–8.5)
RBC # BLD AUTO: 4.01 X10E6/UL (ref 3.77–5.28)
SODIUM SERPL-SCNC: 140 MMOL/L (ref 134–144)
WBC # BLD AUTO: 6.7 X10E3/UL (ref 3.4–10.8)

## 2023-03-07 ENCOUNTER — TELEPHONE (OUTPATIENT)
Dept: NEUROSURGERY | Age: 73
End: 2023-03-07

## 2023-03-07 DIAGNOSIS — I65.21 INTERNAL CAROTID ARTERY STENOSIS, RIGHT: Primary | ICD-10-CM

## 2023-03-07 RX ORDER — PREDNISONE 50 MG/1
TABLET ORAL
Qty: 3 TABLET | Refills: 0 | Status: SHIPPED | OUTPATIENT
Start: 2023-03-07 | End: 2023-03-09

## 2023-03-07 NOTE — TELEPHONE ENCOUNTER
Spoke to patient to confirm procedure tomorrow at [facility] 1540 time is 7:00 am at Patient Registration. Reminded patient:  No eating or drinking after midnight the day prior to procedure and take morning medications the morning of procedure with sips of water. Do not stop taking Blood Thinners if applicable unless notified by this office. You must have a  to drive you home upon discharge. Recommend you have someone with you for at least 24-48 hours post procedure. No metal of glue in hair. Premedication sent to [facility] listed per provider. Patient stated understanding.

## 2023-03-08 ENCOUNTER — HOSPITAL ENCOUNTER (INPATIENT)
Dept: INTERVENTIONAL RADIOLOGY/VASCULAR | Age: 73
LOS: 1 days | Discharge: HOME | DRG: 036 | End: 2023-03-09
Attending: PSYCHIATRY & NEUROLOGY | Admitting: PSYCHIATRY & NEUROLOGY
Payer: MEDICARE

## 2023-03-08 ENCOUNTER — ANESTHESIA EVENT (OUTPATIENT)
Dept: INTERVENTIONAL RADIOLOGY/VASCULAR | Age: 73
DRG: 036 | End: 2023-03-08
Payer: MEDICARE

## 2023-03-08 ENCOUNTER — ANESTHESIA (OUTPATIENT)
Dept: INTERVENTIONAL RADIOLOGY/VASCULAR | Age: 73
DRG: 036 | End: 2023-03-08
Payer: MEDICARE

## 2023-03-08 DIAGNOSIS — I65.21 INTERNAL CAROTID ARTERY STENOSIS, RIGHT: ICD-10-CM

## 2023-03-08 PROBLEM — I65.29 CAROTID ARTERY STENOSIS, SYMPTOMATIC: Status: ACTIVE | Noted: 2023-03-08

## 2023-03-08 LAB
ACT BLD: 233 SECS (ref 79–138)
ACT BLD: 245 SECS (ref 79–138)

## 2023-03-08 PROCEDURE — 77030008584 HC TOOL GDWRE DEV TERU -A

## 2023-03-08 PROCEDURE — 2709999900 HC NON-CHARGEABLE SUPPLY

## 2023-03-08 PROCEDURE — 51798 US URINE CAPACITY MEASURE: CPT

## 2023-03-08 PROCEDURE — 77030003394 HC NDL ART COOK -A

## 2023-03-08 PROCEDURE — 74011250636 HC RX REV CODE- 250/636: Performed by: NURSE ANESTHETIST, CERTIFIED REGISTERED

## 2023-03-08 PROCEDURE — 77030012468 HC VLV BLEEDBK CNTRL ABBT -B

## 2023-03-08 PROCEDURE — 85347 COAGULATION TIME ACTIVATED: CPT

## 2023-03-08 PROCEDURE — C1769 GUIDE WIRE: HCPCS

## 2023-03-08 PROCEDURE — 65610000006 HC RM INTENSIVE CARE

## 2023-03-08 PROCEDURE — 74011250636 HC RX REV CODE- 250/636: Performed by: NURSE PRACTITIONER

## 2023-03-08 PROCEDURE — 74011000250 HC RX REV CODE- 250: Performed by: PSYCHIATRY & NEUROLOGY

## 2023-03-08 PROCEDURE — 36224 PLACE CATH CAROTD ART: CPT

## 2023-03-08 PROCEDURE — B3181ZZ FLUOROSCOPY OF BILATERAL INTERNAL CAROTID ARTERIES USING LOW OSMOLAR CONTRAST: ICD-10-PCS | Performed by: PSYCHIATRY & NEUROLOGY

## 2023-03-08 PROCEDURE — 76060000035 HC ANESTHESIA 2 TO 2.5 HR

## 2023-03-08 PROCEDURE — 77030019569 HC BND COMPR RAD TERU -B

## 2023-03-08 PROCEDURE — 74011000250 HC RX REV CODE- 250

## 2023-03-08 PROCEDURE — C1887 CATHETER, GUIDING: HCPCS

## 2023-03-08 PROCEDURE — 74011250637 HC RX REV CODE- 250/637: Performed by: NURSE PRACTITIONER

## 2023-03-08 PROCEDURE — C1894 INTRO/SHEATH, NON-LASER: HCPCS

## 2023-03-08 PROCEDURE — 74011250636 HC RX REV CODE- 250/636: Performed by: PSYCHIATRY & NEUROLOGY

## 2023-03-08 PROCEDURE — 74011250637 HC RX REV CODE- 250/637: Performed by: PSYCHIATRY & NEUROLOGY

## 2023-03-08 PROCEDURE — 037K3DZ DILATION OF RIGHT INTERNAL CAROTID ARTERY WITH INTRALUMINAL DEVICE, PERCUTANEOUS APPROACH: ICD-10-PCS | Performed by: PSYCHIATRY & NEUROLOGY

## 2023-03-08 PROCEDURE — 74011000636 HC RX REV CODE- 636: Performed by: PSYCHIATRY & NEUROLOGY

## 2023-03-08 PROCEDURE — C1876 STENT, NON-COA/NON-COV W/DEL: HCPCS

## 2023-03-08 PROCEDURE — C1725 CATH, TRANSLUMIN NON-LASER: HCPCS

## 2023-03-08 RX ORDER — VERAPAMIL HYDROCHLORIDE 2.5 MG/ML
20 INJECTION, SOLUTION INTRAVENOUS
Status: COMPLETED | OUTPATIENT
Start: 2023-03-08 | End: 2023-03-08

## 2023-03-08 RX ORDER — ESCITALOPRAM OXALATE 10 MG/1
20 TABLET ORAL DAILY
Status: DISCONTINUED | OUTPATIENT
Start: 2023-03-09 | End: 2023-03-09 | Stop reason: HOSPADM

## 2023-03-08 RX ORDER — LIDOCAINE HYDROCHLORIDE 10 MG/ML
INJECTION INFILTRATION; PERINEURAL
Status: COMPLETED
Start: 2023-03-08 | End: 2023-03-08

## 2023-03-08 RX ORDER — SODIUM CHLORIDE 9 MG/ML
100 INJECTION, SOLUTION INTRAVENOUS CONTINUOUS
Status: DISCONTINUED | OUTPATIENT
Start: 2023-03-08 | End: 2023-03-09 | Stop reason: HOSPADM

## 2023-03-08 RX ORDER — HEPARIN SODIUM 1000 [USP'U]/ML
2000 INJECTION, SOLUTION INTRAVENOUS; SUBCUTANEOUS
Status: COMPLETED | OUTPATIENT
Start: 2023-03-08 | End: 2023-03-08

## 2023-03-08 RX ORDER — CLOPIDOGREL BISULFATE 75 MG/1
75 TABLET ORAL DAILY
Status: DISCONTINUED | OUTPATIENT
Start: 2023-03-09 | End: 2023-03-09 | Stop reason: HOSPADM

## 2023-03-08 RX ORDER — HEPARIN SODIUM 5000 [USP'U]/ML
5000 INJECTION, SOLUTION INTRAVENOUS; SUBCUTANEOUS EVERY 8 HOURS
Status: DISCONTINUED | OUTPATIENT
Start: 2023-03-08 | End: 2023-03-09 | Stop reason: HOSPADM

## 2023-03-08 RX ORDER — LIDOCAINE HYDROCHLORIDE 10 MG/ML
10 INJECTION INFILTRATION; PERINEURAL
Status: COMPLETED | OUTPATIENT
Start: 2023-03-08 | End: 2023-03-08

## 2023-03-08 RX ORDER — BUPROPION HYDROCHLORIDE 150 MG/1
300 TABLET, EXTENDED RELEASE ORAL DAILY
Status: DISCONTINUED | OUTPATIENT
Start: 2023-03-09 | End: 2023-03-09 | Stop reason: HOSPADM

## 2023-03-08 RX ORDER — FENTANYL CITRATE 50 UG/ML
INJECTION, SOLUTION INTRAMUSCULAR; INTRAVENOUS AS NEEDED
Status: DISCONTINUED | OUTPATIENT
Start: 2023-03-08 | End: 2023-03-08 | Stop reason: HOSPADM

## 2023-03-08 RX ORDER — MIDODRINE HYDROCHLORIDE 5 MG/1
5 TABLET ORAL 2 TIMES DAILY WITH MEALS
Status: DISCONTINUED | OUTPATIENT
Start: 2023-03-08 | End: 2023-03-09 | Stop reason: HOSPADM

## 2023-03-08 RX ORDER — VERAPAMIL HYDROCHLORIDE 2.5 MG/ML
2.5 INJECTION, SOLUTION INTRAVENOUS
Status: COMPLETED | OUTPATIENT
Start: 2023-03-08 | End: 2023-03-08

## 2023-03-08 RX ORDER — ONDANSETRON 2 MG/ML
4 INJECTION INTRAMUSCULAR; INTRAVENOUS
Status: DISCONTINUED | OUTPATIENT
Start: 2023-03-08 | End: 2023-03-09 | Stop reason: HOSPADM

## 2023-03-08 RX ORDER — BUPROPION HYDROCHLORIDE 150 MG/1
150 TABLET, EXTENDED RELEASE ORAL
Status: DISCONTINUED | OUTPATIENT
Start: 2023-03-08 | End: 2023-03-09 | Stop reason: HOSPADM

## 2023-03-08 RX ORDER — LIDOCAINE 40 MG/G
CREAM TOPICAL
Status: COMPLETED | OUTPATIENT
Start: 2023-03-08 | End: 2023-03-08

## 2023-03-08 RX ORDER — SODIUM CHLORIDE 9 MG/ML
INJECTION, SOLUTION INTRAVENOUS
Status: DISCONTINUED | OUTPATIENT
Start: 2023-03-08 | End: 2023-03-08 | Stop reason: HOSPADM

## 2023-03-08 RX ORDER — GUAIFENESIN 100 MG/5ML
81 LIQUID (ML) ORAL DAILY
Status: DISCONTINUED | OUTPATIENT
Start: 2023-03-09 | End: 2023-03-09 | Stop reason: HOSPADM

## 2023-03-08 RX ORDER — GUAIFENESIN 100 MG/5ML
325 LIQUID (ML) ORAL DAILY
Status: DISCONTINUED | OUTPATIENT
Start: 2023-03-09 | End: 2023-03-08

## 2023-03-08 RX ORDER — METOPROLOL TARTRATE 25 MG/1
25 TABLET, FILM COATED ORAL EVERY 12 HOURS
Status: DISCONTINUED | OUTPATIENT
Start: 2023-03-08 | End: 2023-03-08

## 2023-03-08 RX ORDER — MIDAZOLAM HYDROCHLORIDE 1 MG/ML
INJECTION, SOLUTION INTRAMUSCULAR; INTRAVENOUS AS NEEDED
Status: DISCONTINUED | OUTPATIENT
Start: 2023-03-08 | End: 2023-03-08 | Stop reason: HOSPADM

## 2023-03-08 RX ORDER — LABETALOL HYDROCHLORIDE 5 MG/ML
10 INJECTION, SOLUTION INTRAVENOUS
Status: DISCONTINUED | OUTPATIENT
Start: 2023-03-08 | End: 2023-03-09 | Stop reason: HOSPADM

## 2023-03-08 RX ORDER — SODIUM BICARBONATE 42 MG/ML
1 INJECTION, SOLUTION INTRAVENOUS
Status: COMPLETED | OUTPATIENT
Start: 2023-03-08 | End: 2023-03-08

## 2023-03-08 RX ORDER — ONDANSETRON 2 MG/ML
INJECTION INTRAMUSCULAR; INTRAVENOUS AS NEEDED
Status: DISCONTINUED | OUTPATIENT
Start: 2023-03-08 | End: 2023-03-08 | Stop reason: HOSPADM

## 2023-03-08 RX ORDER — PANTOPRAZOLE SODIUM 20 MG/1
20 TABLET, DELAYED RELEASE ORAL
Status: DISCONTINUED | OUTPATIENT
Start: 2023-03-09 | End: 2023-03-09 | Stop reason: HOSPADM

## 2023-03-08 RX ORDER — CARBIDOPA AND LEVODOPA 25; 100 MG/1; MG/1
1 TABLET ORAL
Status: DISCONTINUED | OUTPATIENT
Start: 2023-03-08 | End: 2023-03-09 | Stop reason: HOSPADM

## 2023-03-08 RX ORDER — ACETAMINOPHEN 325 MG/1
650 TABLET ORAL
Status: DISCONTINUED | OUTPATIENT
Start: 2023-03-08 | End: 2023-03-09 | Stop reason: HOSPADM

## 2023-03-08 RX ORDER — HEPARIN SODIUM 1000 [USP'U]/ML
INJECTION, SOLUTION INTRAVENOUS; SUBCUTANEOUS AS NEEDED
Status: DISCONTINUED | OUTPATIENT
Start: 2023-03-08 | End: 2023-03-08 | Stop reason: HOSPADM

## 2023-03-08 RX ORDER — ATORVASTATIN CALCIUM 40 MG/1
40 TABLET, FILM COATED ORAL
Status: DISCONTINUED | OUTPATIENT
Start: 2023-03-08 | End: 2023-03-09 | Stop reason: HOSPADM

## 2023-03-08 RX ADMIN — CARBIDOPA AND LEVODOPA 1 TABLET: 25; 100 TABLET ORAL at 22:04

## 2023-03-08 RX ADMIN — ATORVASTATIN CALCIUM 40 MG: 40 TABLET, FILM COATED ORAL at 22:04

## 2023-03-08 RX ADMIN — ONDANSETRON HYDROCHLORIDE 4 MG: 2 INJECTION, SOLUTION INTRAMUSCULAR; INTRAVENOUS at 08:11

## 2023-03-08 RX ADMIN — FENTANYL CITRATE 25 MCG: 50 INJECTION, SOLUTION INTRAMUSCULAR; INTRAVENOUS at 08:01

## 2023-03-08 RX ADMIN — NITROGLYCERIN 1 INCH: 20 OINTMENT TOPICAL at 07:33

## 2023-03-08 RX ADMIN — MIDAZOLAM 2 MG: 1 INJECTION INTRAMUSCULAR; INTRAVENOUS at 07:57

## 2023-03-08 RX ADMIN — SODIUM CHLORIDE 75 ML/HR: 9 INJECTION, SOLUTION INTRAVENOUS at 16:24

## 2023-03-08 RX ADMIN — METHYLPREDNISOLONE SODIUM SUCCINATE 125 MG: 125 INJECTION, POWDER, FOR SOLUTION INTRAMUSCULAR; INTRAVENOUS at 08:04

## 2023-03-08 RX ADMIN — Medication 4000 UNITS: at 08:09

## 2023-03-08 RX ADMIN — BUPROPION HYDROCHLORIDE 150 MG: 150 TABLET, EXTENDED RELEASE ORAL at 22:03

## 2023-03-08 RX ADMIN — MIDAZOLAM 1 MG: 1 INJECTION INTRAMUSCULAR; INTRAVENOUS at 10:02

## 2023-03-08 RX ADMIN — MIDODRINE HYDROCHLORIDE 5 MG: 5 TABLET ORAL at 15:45

## 2023-03-08 RX ADMIN — LIDOCAINE 4%: 4 CREAM TOPICAL at 07:33

## 2023-03-08 RX ADMIN — IOPAMIDOL 172 ML: 612 INJECTION, SOLUTION INTRAVENOUS at 10:00

## 2023-03-08 RX ADMIN — ARIPIPRAZOLE 15 MG: 5 TABLET ORAL at 22:05

## 2023-03-08 RX ADMIN — FENTANYL CITRATE 25 MCG: 50 INJECTION, SOLUTION INTRAMUSCULAR; INTRAVENOUS at 08:27

## 2023-03-08 RX ADMIN — Medication 4000 UNITS: at 08:12

## 2023-03-08 RX ADMIN — SODIUM CHLORIDE: 900 INJECTION, SOLUTION INTRAVENOUS at 07:27

## 2023-03-08 RX ADMIN — Medication 4000 UNITS: at 08:13

## 2023-03-08 RX ADMIN — HEPARIN SODIUM 5000 UNITS: 5000 INJECTION, SOLUTION INTRAVENOUS; SUBCUTANEOUS at 22:03

## 2023-03-08 RX ADMIN — HEPARIN SODIUM 5000 UNITS: 5000 INJECTION, SOLUTION INTRAVENOUS; SUBCUTANEOUS at 14:26

## 2023-03-08 RX ADMIN — VERAPAMIL HYDROCHLORIDE 2.5 MG: 2.5 INJECTION INTRAVENOUS at 08:08

## 2023-03-08 RX ADMIN — VERAPAMIL HYDROCHLORIDE 20 MG: 2.5 INJECTION, SOLUTION INTRAVENOUS at 08:26

## 2023-03-08 RX ADMIN — SODIUM BICARBONATE 42 MG: 42 INJECTION, SOLUTION INTRAVENOUS at 08:08

## 2023-03-08 RX ADMIN — LIDOCAINE HYDROCHLORIDE 2 ML: 10 INJECTION INFILTRATION; PERINEURAL at 08:04

## 2023-03-08 RX ADMIN — HEPARIN SODIUM 2000 UNITS: 1000 INJECTION INTRAVENOUS; SUBCUTANEOUS at 08:07

## 2023-03-08 RX ADMIN — LIDOCAINE HYDROCHLORIDE 2 ML: 10 INJECTION, SOLUTION EPIDURAL; INFILTRATION; INTRACAUDAL; PERINEURAL at 08:04

## 2023-03-08 RX ADMIN — SODIUM CHLORIDE 500 ML: 9 INJECTION, SOLUTION INTRAVENOUS at 15:41

## 2023-03-08 RX ADMIN — HEPARIN SODIUM 3000 UNITS: 1000 INJECTION, SOLUTION INTRAVENOUS; SUBCUTANEOUS at 07:46

## 2023-03-08 RX ADMIN — Medication 4000 UNITS: at 08:10

## 2023-03-08 RX ADMIN — SODIUM CHLORIDE 75 ML/HR: 9 INJECTION, SOLUTION INTRAVENOUS at 11:13

## 2023-03-08 RX ADMIN — NITROGLYCERIN 100 MCG: 10 INJECTION INTRAVENOUS at 08:08

## 2023-03-08 NOTE — ANESTHESIA PREPROCEDURE EVALUATION
Relevant Problems   No relevant active problems       Anesthetic History   No history of anesthetic complications            Review of Systems / Medical History  Patient summary reviewed, nursing notes reviewed and pertinent labs reviewed    Pulmonary  Within defined limits                 Neuro/Psych       CVA      Comments: Severe right internal carotid artery stenosis of 80-90% Cardiovascular    Hypertension          CAD and cardiac stents    Exercise tolerance: >4 METS  Comments: ECHO 1/30/23:    Left Ventricle: Normal left ventricular systolic function with a visually estimated EF of 60 - 65%. Left ventricle size is normal. Increased wall thickness. Findings consistent with mild concentric hypertrophy. Normal wall motion.   Mitral Valve:Mild regurgitation. There is a 1.9 x 1.4 cm moderately sized vegetation present that is mobile and irregular on the anterior leaflet.      GI/Hepatic/Renal     GERD           Endo/Other        Arthritis     Other Findings              Physical Exam    Airway  Mallampati: II  TM Distance: 4 - 6 cm  Neck ROM: normal range of motion   Mouth opening: Normal     Cardiovascular    Rhythm: regular  Rate: normal         Dental      Comments: Upper and lower dentures    Pulmonary  Breath sounds clear to auscultation               Abdominal  GI exam deferred       Other Findings            Anesthetic Plan    ASA: 3  Anesthesia type: MAC          Induction: Intravenous  Anesthetic plan and risks discussed with: Patient

## 2023-03-08 NOTE — PROGRESS NOTES
Shift summary:    -Pt arrived to ICU with no neurological deficits, NIHSS 0.    -Pt oriented to ICU environment.    -Pt passed [employer] swallow screen    -TR band off at 1230    -Upon skin assessment, noted left heel red but blanchable. Right heel dry and cracked. -SBP dropped to 80's with MAP in the 50's. Nikhil Atkinson NP, made aware. Pt given 500 NS bolus and midodrine. SBP improved to the 90's.

## 2023-03-08 NOTE — BRIEF OP NOTE
NEUROINTERVENTIONAL SURGERY POST-PROCEDURE NOTE    PROCEDURE:  Cerebral Angiogram w/ carotid angioplasty and stenting    VESSEL(S) STUDIED:  R ICA VESSEL(S) TREATED:  R ICA      PRELIMINARY REPORT & DISPOSITION:   Critical R ICA stenosis--> R BEKA      See official report for details    COMPLICATIONS:  None    FOLLOW-UP:  SBP   Asa/plavix, statin  IVF   DATE OF SERVICE:  3/8/2023 10:07 AM     ATTENDING SURGEON:  GIRMA Flynn DO    ANESTHESIA:   MAC    MEDICATIONS:   See nursing record    PUNCTURE SITE:  R radial-->vasc band           Signed By: Ilene Martino DO   Neurointerventional Surgery  [facility]    March 8, 2023

## 2023-03-08 NOTE — PROGRESS NOTES
NIS Progress Note:    Rounded on patient post angio. She is resting comfortably and denies any pain or headache.  Right wrist arteriotomy site with 4cc left in TR band, cont to let down per protocol.                - q1 hour neuro checks               - SBP goal , cardene/labetalol PRN   - NS at 75 ml/hr              - Cont DAPT with ASA 81 mg and Plavix 75 mg daily    Diet: Regular  Activity: Up with assist after bedrest is completed  DVT prophylaxis: SCDs  Isolation precautions: None  Anticipated disposition: Home with office follow up    Jolie Anton NP  Neurocritical Care Nurse Practitioner  [phone]

## 2023-03-08 NOTE — PROGRESS NOTES
TRANSFER - OUT REPORT:    Verbal report given to [facility] MIREYA HAGEN RN(name) on Delma Diez  being transferred to ICU(unit) for routine progression of care       Report consisted of patients Situation, Background, Assessment and   Recommendations(SBAR). Information from the following report(s) SBAR, Kardex, Procedure Summary, Intake/Output and MAR was reviewed with the receiving nurse. Lines:   Peripheral IV 03/08/23 Anterior;Left;Proximal Forearm (Active)        Opportunity for questions and clarification was provided.       Patient transported with:   Monitor  O2 @ 2 liters  Registered Nurse

## 2023-03-08 NOTE — PROGRESS NOTES
Pt moved to angio suite table with no assistance. Anesthesia at bedside to manage pt airway, pt medications, pt VS and pt status. Provider, anesthesia, IR staff in room for timeout, pt awake when possible and participating. Pt VSS.

## 2023-03-08 NOTE — INTERVAL H&P NOTE
Update History & Physical    The Patient's recent History and Physical was reviewed with the patient and I examined the patient. There was no change. Plan:  The risk, benefits, expected outcome, and alternative to the recommended procedure have been discussed with the patient. Patient understands and wants to proceed with the procedure.     Electronically signed by Martinez Mooney DO on 3/8/2023 at 8:04 AM

## 2023-03-08 NOTE — ROUTINE PROCESS
Pt arrives ambulatory to angio department accompanied by family  for cerebral angiogram and carotid stenting procedure. All assessments completed and consent was reviewed. Education given was regarding procedure, anesthesia sedation, post-procedure care and  management/follow-up. Opportunity for questions was provided and all questions and concerns were addressed.

## 2023-03-09 VITALS
OXYGEN SATURATION: 98 % | BODY MASS INDEX: 26.41 KG/M2 | WEIGHT: 158.73 LBS | TEMPERATURE: 97.9 F | HEART RATE: 62 BPM | SYSTOLIC BLOOD PRESSURE: 107 MMHG | RESPIRATION RATE: 17 BRPM | DIASTOLIC BLOOD PRESSURE: 52 MMHG

## 2023-03-09 LAB
ANION GAP SERPL CALC-SCNC: 7 MMOL/L (ref 5–15)
BUN SERPL-MCNC: 23 MG/DL (ref 6–20)
BUN/CREAT SERPL: 17 (ref 12–20)
CALCIUM SERPL-MCNC: 8.2 MG/DL (ref 8.5–10.1)
CHLORIDE SERPL-SCNC: 108 MMOL/L (ref 97–108)
CO2 SERPL-SCNC: 25 MMOL/L (ref 21–32)
CREAT SERPL-MCNC: 1.36 MG/DL (ref 0.55–1.02)
ERYTHROCYTE [DISTWIDTH] IN BLOOD BY AUTOMATED COUNT: 14.2 % (ref 11.5–14.5)
GLUCOSE SERPL-MCNC: 127 MG/DL (ref 65–100)
HCT VFR BLD AUTO: 24.9 % (ref 35–47)
HGB BLD-MCNC: 8.3 G/DL (ref 11.5–16)
MCH RBC QN AUTO: 30.4 PG (ref 26–34)
MCHC RBC AUTO-ENTMCNC: 33.3 G/DL (ref 30–36.5)
MCV RBC AUTO: 91.2 FL (ref 80–99)
NRBC # BLD: 0 K/UL (ref 0–0.01)
NRBC BLD-RTO: 0 PER 100 WBC
PLATELET # BLD AUTO: 222 K/UL (ref 150–400)
PMV BLD AUTO: 9.2 FL (ref 8.9–12.9)
POTASSIUM SERPL-SCNC: 2.4 MMOL/L (ref 3.5–5.1)
RBC # BLD AUTO: 2.73 M/UL (ref 3.8–5.2)
SODIUM SERPL-SCNC: 140 MMOL/L (ref 136–145)
WBC # BLD AUTO: 12.7 K/UL (ref 3.6–11)

## 2023-03-09 PROCEDURE — 74011250636 HC RX REV CODE- 250/636: Performed by: NURSE PRACTITIONER

## 2023-03-09 PROCEDURE — 80048 BASIC METABOLIC PNL TOTAL CA: CPT

## 2023-03-09 PROCEDURE — 85027 COMPLETE CBC AUTOMATED: CPT

## 2023-03-09 PROCEDURE — 36415 COLL VENOUS BLD VENIPUNCTURE: CPT

## 2023-03-09 PROCEDURE — 74011250637 HC RX REV CODE- 250/637: Performed by: NURSE PRACTITIONER

## 2023-03-09 PROCEDURE — 74011250636 HC RX REV CODE- 250/636: Performed by: PSYCHIATRY & NEUROLOGY

## 2023-03-09 RX ORDER — POTASSIUM CHLORIDE 750 MG/1
40 TABLET, EXTENDED RELEASE ORAL ONCE
Qty: 4 TABLET | Refills: 0 | Status: SHIPPED | OUTPATIENT
Start: 2023-03-09 | End: 2023-03-09

## 2023-03-09 RX ORDER — POTASSIUM CHLORIDE 7.45 MG/ML
10 INJECTION INTRAVENOUS
Status: DISCONTINUED | OUTPATIENT
Start: 2023-03-09 | End: 2023-03-09

## 2023-03-09 RX ORDER — POTASSIUM CHLORIDE 750 MG/1
40 TABLET, FILM COATED, EXTENDED RELEASE ORAL
Status: COMPLETED | OUTPATIENT
Start: 2023-03-09 | End: 2023-03-09

## 2023-03-09 RX ADMIN — BUPROPION HYDROCHLORIDE 300 MG: 150 TABLET, EXTENDED RELEASE ORAL at 08:05

## 2023-03-09 RX ADMIN — ASPIRIN 81 MG CHEWABLE TABLET 81 MG: 81 TABLET CHEWABLE at 08:04

## 2023-03-09 RX ADMIN — POTASSIUM CHLORIDE 40 MEQ: 750 TABLET, FILM COATED, EXTENDED RELEASE ORAL at 08:05

## 2023-03-09 RX ADMIN — POTASSIUM CHLORIDE 10 MEQ: 7.46 INJECTION, SOLUTION INTRAVENOUS at 08:15

## 2023-03-09 RX ADMIN — POTASSIUM CHLORIDE 40 MEQ: 750 TABLET, FILM COATED, EXTENDED RELEASE ORAL at 10:01

## 2023-03-09 RX ADMIN — HEPARIN SODIUM 5000 UNITS: 5000 INJECTION, SOLUTION INTRAVENOUS; SUBCUTANEOUS at 07:18

## 2023-03-09 RX ADMIN — ESCITALOPRAM OXALATE 20 MG: 10 TABLET ORAL at 08:05

## 2023-03-09 RX ADMIN — PANTOPRAZOLE SODIUM 20 MG: 20 TABLET, DELAYED RELEASE ORAL at 07:18

## 2023-03-09 RX ADMIN — CLOPIDOGREL BISULFATE 75 MG: 75 TABLET ORAL at 08:05

## 2023-03-09 NOTE — PROGRESS NOTES
1930: Bedside and Verbal shift change report given to Jackelin BURNS (oncoming nurse) by Branden Hinson (offgoing nurse). Report included the following information SBAR, Procedure Summary, MAR, Recent Results, Cardiac Rhythm sinus rhythm, and Dual Neuro Assessment. 1945: Neuro NP, Jorge Contreras at bedside. 2000: assumed care of patient. Neuro exam complete. No neuro deficits. NIH 0. Patient has no complaints of pain. 2045:  assisted patient to toilet. Patient steady with stand-by assist.    0730: Bedside and Verbal shift change report given to Branden Hinson (oncoming nurse) by Saud Colmenares RN (offgoing nurse). Report included the following information SBAR, Intake/Output, MAR, and Recent Results.

## 2023-03-09 NOTE — PROGRESS NOTES
No changes in pt's neurological status. NIHSS 0. VSS. Reviewed AVS with pt including medications, follow up appointments, and discharge instructions. Pt verbalized understanding. Opportunity for questions and clarification provided. Pt transported to discharge area with this RN at [phone], picked up by daughter. Clothing, phone and , glasses, and discharge instructions went with pt.

## 2023-03-09 NOTE — PROGRESS NOTES
Reason for Admission:   Scheduled Cerebral angiogram                    RUR Score:    16%              PCP: First and Last name:   Margot Bellamy MD     Name of Practice:    Are you a current patient: Yes/No:    Approximate date of last visit:    Can you participate in a virtual visit if needed:     Do you (patient/family) have any concerns for transition/discharge? No               Plan for utilizing home health:   No    Current Advanced Directive/Advance Care Plan:  Full Code      Healthcare Decision Maker:   Click here to complete [address] including selection of the Healthcare Decision Maker Relationship (ie \"Primary\")              Transition of Care Plan:          Patient is s/p Cerebral angio gram. Patient is discharged today, no needs identified. Archie Monsalve RN,Care Management   Care Management Interventions  PCP Verified by CM:  Yes (Dr Enrique Edwards )  [address] DaydayCrispin Martin: No  Discharge Durable Medical Equipment: No  Physical Therapy Consult: No  Speech Therapy Consult: No  Support Systems: Other Family Member(s) (MPOA Sister : Fernie Wang [phone])

## 2023-03-09 NOTE — PROGRESS NOTES
Neurocritical Care Brief Progress Note:  Patient is s/p Cerebral Angiogram w/ carotid angioplasty and stenting today with Dr. Rohan Gary. The patient denied any pain or discomfort. SBP low 100's, started on low dose midodrine 5mg bid. denied any dizziness, visual changes or neurological complaints. Resting comfortably in bed. IVF at 75 ml/hr. R radial vasc access is c/d/i, bleed/hematoma, mild bruise otherwise no tenderness and + radial pulse. Neuro exams intact. Addendum  Morning labs showed hypokalemia of 2.4; 40mEq IV and PO ordered. Slight incr in cr. Increased NS rate to 100 ml/hr. Physical Exam:   Blood pressure (!) 98/48, pulse 65, temperature 97.8 °F (36.6 °C), resp. rate 17, weight 72 kg (158 lb 11.7 oz), SpO2 100 %, not currently breastfeeding. GEN: Cooperative, Calm, Well developed, well nourished patient in NAD  HEENT: Normocephalic. Non-icter, no congestion  Lungs:  CTA bilaterally Ant  Cardiac: S1,S2, normal rate and rhythm, with no murmurs. no carotid bruits, no gallops  Abdomen: Normal bowel sounds, no distention, soft, non-tender  Extremities: 2+ Radial pulses, no clubbing, cyanosis, or edema  Skin: no rashes or lesions noted      NEURO:  Mental status: Oriented to time, situation, place and person. Able to follow commands appropriately  Cranial Nerves:  II-XII intact; Speech and language intact. Attention and fund of knowledge appropriate. Normal recent and remote memory. EOMI, PERRL, no nystagmus, no ptosis. No dysarthria or aphasia. Full facial strength, no asymmetry. Hearing intact bilaterally. Tongue protrudes to midline, palate elevates symmetrically. Shrug Shoulders B/L  Motor:  Normal bulk and tone, 5/5 strength x 4 extremities proximally and distally; No involuntary movements. Coordination:  Intact FTN and HTS testing  Reflexes:  +2 throughout, down going toes bilaterally   Sensation: Intact x 4 extremities to Light Touch  Gait:  Deferred          Continue current plan per NIS. >25% time spent in counseling or coordination of care of the above in the assessment and plan       Kike Buckner  Neurocritical Care Nurse Practitioner  [phone]

## 2023-03-09 NOTE — DISCHARGE SUMMARY
Neurointerventional Surgery Discharge Summary  [address], [facility] RASHEL Varela    Patient: Lucia Bradshaw MRN: [id number]  SSN: [id number]    YOB: 1950  Age: 67 y.o. Sex: female      DATE OF ADMISSION: 3/8/2023    DATE OF DISCHARGE: 03/09/23     ADMITTING PROVIDER: Lashawn Kuhn DO    DISCHARGING PROVIDER: Same    PROCEDURES/SURGERIES: Procedure(s) with comments: Cerebral Angiogram w/ carotid angioplasty and stenting    OFFICE NUMBER: [phone] , you can reach the on call physician 24/7 even during non-business hours     [address]:     ICD-10-CM ICD-9-CM    1. Internal carotid artery stenosis, right  I65.21 433.10 IR ANGIO CAROTID CRBRL BI INTNL      IR ANGIO CAROTID CRBRL BI INTNL        Pt was admitted for scheduled elective cerebral angiogram and right carotid angioplasty and stenting by Dr. Lakesha Howe. The procedure was performed in the Interventional Radiology suite under MAC anesthesia. The patient tolerated the procedure well without complications. Following extubation, patient was transferred to ICU, fully recovered and returned to neurological baseline. The patient was admitted overnight for continued monitoring due to potential risks of stroke and thrombosis. There were no neurological events overnight. She did have an episode of hypotension yesterday evening, was given a 500 ml fluid bolus and 5 mg midodrine. BP recovered overnight, noted in 100's/50s this am. Pt was instructed to hold her home metoprolol and monitor her BP at least 3 times per day at home. She has a home BP cuff and states that she will monitor her BP. She was also instructed to make a follow up appt with her PCP for ongoing BP management. Her K was 2.5 this am. She was given 40 meq PO replacement x 2. She will be discharged with an additional 40 meq to take tonight at home.        Patient Vitals for the past 12 hrs:   Temp Pulse Resp BP SpO2   03/09/23 0800 97.9 °F (36.6 °C) -- -- -- --   03/09/23 0700 -- (!) 57 16 -- 99 %   03/09/23 0600 -- (!) 59 14 -- 99 %   03/09/23 0500 -- (!) 57 14 -- 98 %   03/09/23 0400 97.8 °F (36.6 °C) (!) 59 14 (!) 96/47 98 %   03/09/23 0300 -- (!) 57 15 (!) 98/52 --   03/09/23 0200 -- 62 18 -- 99 %   03/09/23 0100 -- 62 18 (!) 97/44 98 %   03/09/23 0000 97.7 °F (36.5 °C) 63 18 -- 99 %   03/08/23 2300 -- 62 15 (!) 100/47 95 %   03/08/23 2200 -- 72 15 (!) 127/54 100 %       Physical Exam:  GENERAL: NAD, calm, cooperative  HEART: RRR  LUNGS: CTAB  SKIN: Warm, dry, color appropriate for ethnicity. Arteriotomy access site soft and non-tender on palpation, dressing is C/D/I, with no active bleeding or drainage noted. Neurologic Exam:  Mental Status:  Alert and oriented x 4. Appropriate affect, mood and behavior. Language:    Normal fluency, repetition, comprehension and naming. Follows commands    Cranial Nerves:   Pupils 3 mm bilaterally, equal, round and briskly reactive to light. Visual fields full to confrontation. Extraocular movements intact. Facial sensation intact. Full facial strength, no asymmetry. Hearing intact bilaterally. No dysarthria. Tongue protrudes to midline, palate elevates symmetrically. Shoulder shrug 5/5 bilaterally. Motor:    No pronator drift. Bulk and tone normal.      5/5 power in all extremities proximally and distally. No involuntary movements. Sensation:    Sensation intact throughout to light touch. Coordination & Gait: Gait deferred. FTN and HTS intact with no ataxia present. DIET:   Normal diet    PLAN:  Discharge home with self care. FOLLOW UP APPOINTMENTS:   1. Follow up in the [facility] clinic with Dr. Sole Franz as scheduled  2. Please make an appointment to follow up with PCP for ongoing BP management. ACTIVITY:   Do not lift anything over 10 lbs for two weeks. Rest and hydrate. May resume all physical activities as tolerated after 2 weeks. WOUND CARE:   Monitor for signs and sx of infection including fever, expanding inflammation and discolored drainage. Report any changes in temperature in affected extremity, numbness, weakness or hematoma. If you experience any increased bruising or bleeding at your puncture site either outside or under the skin please apply direct, firm pressure for 20 minutes. Keep track of the bruising at the site by marking it with a pen or marker. If the bruising continues to be dark purple or blue in color, OR is expanding, please call our office to let the on call doctor know. We have someone on call 24/7. You may shower normally and cleanse the site with gentle soap. Do not soak in the bathtub. Do not apply any lotions, creams, powders, or other cosmetic products to the site. OTHER RECOMMENDATIONS:  BEFAST reviewed to educate for stroke symptoms. Please call 911 and proceed to emergency department immediately if you develop any of the following:  - Acute changes in your balance or vision  - Weakness in your face, arm or leg   - Speech changes or difficulties. DISCHARGE MEDICATIONS:   1. Resume all home meds except for metoprolol  2. Continue Aspirin 81 mg daily and Plavix 75 mg daily   3. Check your BP at home    Avoid NSAIDS including (Advil, Motrin, Ibuprofen, Celebrex etc) while you are taking Aspirin and Brilinta/Plavix. You may take Tylenol for pain.      Labs:  Lab Results   Component Value Date/Time    Sodium 140 03/09/2023 02:51 AM    Potassium 2.4 (LL) 03/09/2023 02:51 AM    Chloride 108 03/09/2023 02:51 AM    CO2 25 03/09/2023 02:51 AM    Anion gap 7 03/09/2023 02:51 AM    Glucose 127 (H) 03/09/2023 02:51 AM    BUN 23 (H) 03/09/2023 02:51 AM    Creatinine 1.36 (H) 03/09/2023 02:51 AM    BUN/Creatinine ratio 17 03/09/2023 02:51 AM    Calcium 8.2 (L) 03/09/2023 02:51 AM     Lab Results   Component Value Date/Time    WBC 12.7 (H) 03/09/2023 02:51 AM    Hemoglobin (POC) 9.5 (L) 08/10/2013 03:12 PM    HGB 8.3 (L) 03/09/2023 02:51 AM    Hematocrit (POC) 28 (L) 08/10/2013 03:12 PM    HCT 24.9 (L) 03/09/2023 02:51 AM    PLATELET 943 27/88/5503 02:51 AM    MCV 91.2 03/09/2023 02:51 AM     Lab Results   Component Value Date/Time    MCH 30.4 03/09/2023 02:51 AM    MCHC 33.3 03/09/2023 02:51 AM    BASOPHILS 1 02/01/2023 05:53 AM    ABS. LYMPHOCYTES 0.7 (L) 02/01/2023 05:53 AM    ABS. MONOCYTES 0.7 02/01/2023 05:53 AM    ABS. EOSINOPHILS 0.3 02/01/2023 05:53 AM    ABS. BASOPHILS 0.1 02/01/2023 05:53 AM    Phosphorus 4.1 01/31/2023 06:47 AM    Magnesium 2.3 01/31/2023 06:47 AM       Current Discharge Medication List        START taking these medications    Details   potassium chloride (KLOR-CON M10) 10 mEq tablet Take 4 Tablets by mouth once for 1 dose. Qty: 4 Tablet, Refills: 0  Start date: 3/9/2023, End date: 3/9/2023           CONTINUE these medications which have NOT CHANGED    Details   fexofenadine (ALLEGRA) 180 mg tablet Take 180 mg by mouth daily. !! buPROPion SR (WELLBUTRIN SR) 150 mg SR tablet Take 150 mg by mouth nightly. clopidogreL (PLAVIX) 75 mg tab Take 75 mg by mouth daily. metoprolol tartrate (LOPRESSOR) 25 mg tablet Take 25 mg by mouth two (2) times a day. ARIPiprazole (ABILIFY) 15 mg tablet nightly. !! buPROPion SR (WELLBUTRIN SR) 150 mg SR tablet Take 300 mg by mouth daily. carbidopa-levodopa (SINEMET)  mg per tablet Take 1 Tablet by mouth nightly. FOR RESTLESS LEG SYNDROME      cyclobenzaprine (FLEXERIL) 10 mg tablet Take 10 mg by mouth two (2) times daily as needed for Muscle Spasm(s) (back pain). escitalopram oxalate (LEXAPRO) 20 mg tablet Take 20 mg by mouth daily. esomeprazole (NEXIUM) 40 mg capsule Take 40 mg by mouth Daily (before breakfast). Indications: gastroesophageal reflux disease      gabapentin (NEURONTIN) 600 mg tablet Take 600 mg by mouth daily as needed for Pain (BACK PAIN). !! - Potential duplicate medications found.  Please discuss with provider.         STOP taking these medications       predniSONE (DELTASONE) 50 mg tablet Comments:   Reason for Stopping:         diphenhydrAMINE (BENADRYL) 50 mg tablet Comments:   Reason for Stopping:         prochlorperazine (COMPAZINE) 5 mg tablet Comments:   Reason for Stopping:         traMADol (ULTRAM) 50 mg tablet Comments:   Reason for Stopping:         fluticasone propionate (FLONASE) 50 mcg/actuation nasal spray Comments:   Reason for Stopping:         acetaminophen (TYLENOL) 325 mg tablet Comments:   Reason for Stopping:         ascorbic acid, vitamin C, (VITAMIN C) 500 mg tablet Comments:   Reason for Stopping:         turmeric root extract 500 mg cap Comments:   Reason for Stopping:         vitamins a & d cap Comments:   Reason for Stopping:         diclofenac (VOLTAREN) 1 % gel Comments:   Reason for Stopping:         ergocalciferol (ERGOCALCIFEROL) 1,250 mcg (50,000 unit) capsule Comments:   Reason for Stopping:         ferrous sulfate 325 mg (65 mg iron) tablet Comments:   Reason for Stopping:             Greater than 30 minutes were spent in patient management, greater than half of which was spent in counseling and coordination of care    Worthy Boeck, NP  Neurointerventional Surgery

## 2023-03-10 NOTE — ANESTHESIA POSTPROCEDURE EVALUATION
Post-Anesthesia Evaluation and Assessment    Patient: Mere Cates MRN: [id number]  SSN: [id number]    YOB: 1950  Age: 67 y.o. Sex: female      I have evaluated the patient and they are stable and ready for discharge from the PACU. Cardiovascular Function/Vital Signs  Patient Handoff Status  Level of Response: Alert; Awake  O2 Device: Nasal cannula  Status: Patent  BP: 112/57  Pulse (Heart Rate): 72  O2 Sat (%): 99 %  Resp Rate: 12  Temp: 36 °C (96.8 °F)  Temp Source: Temporal    Patient is status post * No anesthesia type entered * anesthesia for * No procedures listed *. Nausea/Vomiting: None    Postoperative hydration reviewed and adequate. Pain:  Managed    Neurological Status: At baseline    Mental Status, Level of Consciousness: Alert and  oriented to person, place, and time    Pulmonary Status:   Adequate oxygenation and airway patent    Complications related to anesthesia: None    Post-anesthesia assessment completed. No concerns    Signed By: Parker Richards DO     March 8, 2023                * No procedures listed *. MAC    <BSHSIANPOST>    INITIAL Post-op Vital signs: No vitals data found for the desired time range.

## 2023-03-21 ENCOUNTER — OFFICE VISIT (OUTPATIENT)
Dept: NEUROSURGERY | Age: 73
End: 2023-03-21
Payer: MEDICARE

## 2023-03-21 VITALS
HEIGHT: 65 IN | BODY MASS INDEX: 26.33 KG/M2 | SYSTOLIC BLOOD PRESSURE: 122 MMHG | WEIGHT: 158 LBS | HEART RATE: 66 BPM | OXYGEN SATURATION: 98 % | DIASTOLIC BLOOD PRESSURE: 82 MMHG | TEMPERATURE: 96.7 F

## 2023-03-21 DIAGNOSIS — I65.21 INTERNAL CAROTID ARTERY STENOSIS, RIGHT: Primary | ICD-10-CM

## 2023-03-21 PROCEDURE — 3017F COLORECTAL CA SCREEN DOC REV: CPT | Performed by: PSYCHIATRY & NEUROLOGY

## 2023-03-21 PROCEDURE — G8432 DEP SCR NOT DOC, RNG: HCPCS | Performed by: PSYCHIATRY & NEUROLOGY

## 2023-03-21 PROCEDURE — G8417 CALC BMI ABV UP PARAM F/U: HCPCS | Performed by: PSYCHIATRY & NEUROLOGY

## 2023-03-21 PROCEDURE — 99213 OFFICE O/P EST LOW 20 MIN: CPT | Performed by: PSYCHIATRY & NEUROLOGY

## 2023-03-21 PROCEDURE — 1111F DSCHRG MED/CURRENT MED MERGE: CPT | Performed by: PSYCHIATRY & NEUROLOGY

## 2023-03-21 PROCEDURE — G8400 PT W/DXA NO RESULTS DOC: HCPCS | Performed by: PSYCHIATRY & NEUROLOGY

## 2023-03-21 PROCEDURE — 1090F PRES/ABSN URINE INCON ASSESS: CPT | Performed by: PSYCHIATRY & NEUROLOGY

## 2023-03-21 PROCEDURE — 1123F ACP DISCUSS/DSCN MKR DOCD: CPT | Performed by: PSYCHIATRY & NEUROLOGY

## 2023-03-21 PROCEDURE — 1101F PT FALLS ASSESS-DOCD LE1/YR: CPT | Performed by: PSYCHIATRY & NEUROLOGY

## 2023-03-21 PROCEDURE — G8536 NO DOC ELDER MAL SCRN: HCPCS | Performed by: PSYCHIATRY & NEUROLOGY

## 2023-03-21 PROCEDURE — G8427 DOCREV CUR MEDS BY ELIG CLIN: HCPCS | Performed by: PSYCHIATRY & NEUROLOGY

## 2023-03-21 RX ORDER — ROSUVASTATIN CALCIUM 20 MG/1
20 TABLET, COATED ORAL DAILY
COMMUNITY
Start: 2023-03-20

## 2023-03-21 RX ORDER — ASPIRIN 325 MG
325 TABLET ORAL DAILY
COMMUNITY

## 2023-03-21 NOTE — LETTER
3/21/2023    Patient: Óscar Mcallister   YOB: 1950   Date of Visit: 3/21/2023     Burak Guillermo MD  [facility] [address]  [address] [address]  Via Fax: [phone]    Dear Burak Guillermo MD,      Thank you for referring Ms. Petr Brooks to [address] [place] [address] for evaluation. My notes for this consultation are attached. If you have questions, please do not hesitate to call me. I look forward to following your patient along with you.       Sincerely,    Toyin Mullen, DO

## 2023-03-21 NOTE — PROGRESS NOTES
Procedure follow up for Carotic artery stenosis. Sister at visit with patient. Patient reports right radial puncture site healed without difficulty. Reports no symptoms at this time. Denies headaches, dizziness, numbness or tingling, blurred or double vision. No acute problems reported.
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
## 2023-05-09 ENCOUNTER — TELEPHONE (OUTPATIENT)
Age: 73
End: 2023-05-09

## 2023-05-09 NOTE — TELEPHONE ENCOUNTER
Patient LVM stating that she has \"been having trouble with her right arm' since the procedure. Please advise.
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
## 2023-05-12 NOTE — TELEPHONE ENCOUNTER
Message left for patient with provider recommendations. See his note dated 5/9/2023. Call office with questions.

## 2023-05-30 DIAGNOSIS — I65.21 INTERNAL CAROTID ARTERY STENOSIS, RIGHT: Primary | ICD-10-CM

## 2023-05-31 ENCOUNTER — TELEPHONE (OUTPATIENT)
Age: 73
End: 2023-05-31

## 2023-05-31 NOTE — TELEPHONE ENCOUNTER
Spoke to patient to remind her that her imaging still needs to be scheduled. She said that she would call them today to get it scheduled.

## 2023-06-16 ENCOUNTER — TELEPHONE (OUTPATIENT)
Age: 73
End: 2023-06-16

## 2023-08-23 ENCOUNTER — TELEPHONE (OUTPATIENT)
Age: 73
End: 2023-08-23
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
## 2023-12-07 ENCOUNTER — HOSPITAL ENCOUNTER (OUTPATIENT)
Facility: HOSPITAL | Age: 73
Discharge: HOME | End: 2023-12-09
Attending: FAMILY MEDICINE
Payer: MEDICARE

## 2023-12-07 DIAGNOSIS — M79.661 PAIN OF RIGHT LOWER LEG: ICD-10-CM

## 2023-12-07 PROCEDURE — 93922 UPR/L XTREMITY ART 2 LEVELS: CPT

## 2023-12-08 LAB
VAS LEFT ARM BP: 163 MMHG
VAS LEFT TBI: 0.97
VAS LEFT TOE PRESSURE: 158 MMHG
VAS RIGHT ARM BP: 148 MMHG
VAS RIGHT TBI: 0.6
VAS RIGHT TOE PRESSURE: 98 MMHG

## 2023-12-14 PROBLEM — M79.661 PAIN OF RIGHT LOWER LEG: Status: ACTIVE | Noted: 2023-12-14

## 2024-01-20 NOTE — PROGRESS NOTES
[address] [facility] Adult  Hospitalist Group                                                                                          Hospitalist Progress Note  Crystal Lopez MD  Answering service: [phone] from in house phone        Date of Service:  2023  NAME:  Rhonda Whaley  :  1950  MRN:  [id number]      Admission Summary:   \"67year-old woman with past medical history significant for hypertension, anxiety/depression, dyslipidemia, restless legs syndrome presented at the outside facility emergency room with slurred speech and left arm weakness. These symptoms lasted for approximately 30 minutes and occurred one hour before coming to the emergency room. When the patient arrived at the emergency room, code stroke was activated. The CT scan of the head without contrast was obtained. This did not show acute pathology. The CTA of the head and neck was then obtained which shows severe right internal carotid artery stenosis. The patient was transferred to [facility] for further evaluation. The patient has no record of prior admission to this hospital.  The patient denies history of prior stroke. \"     Interval history / Subjective:   Patient is seen and examined at bedside this AM. She feels good. No overnight events or new complaints.  Waiting for cards eval.   Discussed with nursing, cm, Neuro NP and ID Dr. Trip Davis:     Acute CVA  Right internal carotid artery stenosis  - Presenting with left arm weakness, slurred speech  - MRI brain with multiple small foci of acute infarction throughout the right middle cerebral artery territory  - appreciate neuro input   - CTA brain with severe right internal carotid artery stenosis  - neuroIR consulted and plan for stent placement as outpt   - Continue statin, Plavix, ASA    Mitral valve vegetation   - Echo with 1.9 x 1.4 cm moderately sized vegetation that is mobile and irregular on the anterior leaflet. - Cardiology consult pending for 2 days now - will reach out again today   - BCX:NGTD  - ID consult placed. Discussed with Dr. Phillip Terrell - recs ELPIDIO  - NPO after MN for possible ELPIDIO tomorrow by cardiology   - c/w empirically on IV ceftriaxone for now     Acute cystitis ruled out  - urine cx neg     Acute renal insufficiency:   - unknown baseline,   - monitor renal function     HTN  - Holding home meds for now    HLD  - continue statin     CAD s/p stent   - plavix    Restless leg- sinemet  Anxiety depression- wellbutrin, lexapro, abilify   Anemia- stable, monitor      Code status: full   Prophylaxis: lovenox  Care Plan discussed with: patient, neuro  Anticipated Disposition: TBD, >48 hours now awaiting cardiology workup     Hospital Problems  Date Reviewed: 1/30/2023            Codes Class Noted POA    * (Principal) Acute CVA (cerebrovascular accident) [facility]) ICD-10-CM: I63.9  ICD-9-CM: 434.91  1/30/2023 Yes        Internal carotid artery stenosis, right ICD-10-CM: N87.33  ICD-9-CM: 433.10  1/29/2023 Unknown           Review of Systems:   A comprehensive review of systems was negative except for that written in the HPI. Vital Signs:    Last 24hrs VS reviewed since prior progress note. Most recent are:  Visit Vitals  /72   Pulse 81   Temp 98 °F (36.7 °C)   Resp 19   Ht 5' 5\" (1.651 m)   Wt 76.7 kg (169 lb)   SpO2 98%   BMI 28.12 kg/m²       No intake or output data in the 24 hours ending 02/01/23 7167       Physical Examination:     I had a face to face encounter with this patient and independently examined them on 2/1/2023 as outlined below:          Constitutional:  No acute distress, cooperative, pleasant    ENT:  Oral mucosa moist, oropharynx benign. Resp:  CTA bilaterally. No wheezing/rhonchi/rales. No accessory muscle use. CV:  Regular rhythm, normal rate, no murmurs, gallops, rubs    GI:  Soft, non distended, non tender.  normoactive bowel sounds    Musculoskeletal:  No edema, warm, 2+ pulses throughout    Neurologic:  Moves all extremities. AAOx3, CN II-XII reviewed            Data Review:    Review and/or order of clinical lab test  Review and/or order of tests in the radiology section of CPT  Review and/or order of tests in the medicine section of CPT      Labs:     Recent Labs     02/01/23 0553 01/31/23 0647   WBC 8.3 6.1   HGB 11.5 11.4*   HCT 34.8* 34.9*    275       Recent Labs     02/01/23 0553 01/31/23 0647 01/30/23 0409 01/29/23 2009    140  --  139   K 3.8 3.6  --  3.3*    108  --  105   CO2 26 25  --  27   BUN 12 11  --  17   CREA 1.14* 1.08*  --  1.27*   GLU 99 110*  --  97   CA 8.9 8.8  --  9.1   MG  --  2.3 2.3  --    PHOS  --  4.1 3.9  --        Recent Labs     01/29/23 2009   ALT 24   AP 57   TBILI 0.3   TP 6.8   ALB 3.5   GLOB 3.3       Recent Labs     01/29/23 2009   INR 1.1   PTP 14.2        Recent Labs     01/30/23 0630 01/30/23 0409   TIBC  --  334   PSAT  --  16*   FERR 38  --         Lab Results   Component Value Date/Time    Folate 11.5 01/30/2023 06:30 AM        No results for input(s): PH, PCO2, PO2 in the last 72 hours. No results for input(s): CPK, CKNDX, TROIQ in the last 72 hours.     No lab exists for component: CPKMB  Lab Results   Component Value Date/Time    Cholesterol, total 191 01/30/2023 04:09 AM    HDL Cholesterol 38 01/30/2023 04:09 AM    LDL, calculated 105.4 (H) 01/30/2023 04:09 AM    Triglyceride 238 (H) 01/30/2023 04:09 AM    CHOL/HDL Ratio 5.0 01/30/2023 04:09 AM     Lab Results   Component Value Date/Time    Glucose (POC) 107 (H) 01/29/2023 07:34 PM    Glucose (POC) 78 08/10/2013 03:12 PM     Lab Results   Component Value Date/Time    Color Yellow/Straw 01/29/2023 08:09 PM    Appearance Hazy (A) 01/29/2023 08:09 PM    Specific gravity 1.010 01/29/2023 08:09 PM    pH (UA) 5.0 01/29/2023 08:09 PM    Protein Negative 01/29/2023 08:09 PM    Glucose Negative 01/29/2023 08:09 PM    Ketone Negative 01/29/2023 08:09 PM Bilirubin Negative 01/29/2023 08:09 PM    Urobilinogen 0.1 (L) 01/29/2023 08:09 PM    Nitrites Positive (A) 01/29/2023 08:09 PM    Leukocyte Esterase Large (A) 01/29/2023 08:09 PM    Epithelial cells Many (A) 01/29/2023 08:09 PM    Bacteria 1+ (A) 01/29/2023 08:09 PM    WBC >100 (H) 01/29/2023 08:09 PM    RBC 10-20 01/29/2023 08:09 PM         Medications Reviewed:     Current Facility-Administered Medications   Medication Dose Route Frequency    cefTRIAXone (ROCEPHIN) 2 g in 0.9% sodium chloride 20 mL IV syringe  2 g IntraVENous Q24H    ARIPiprazole (ABILIFY) tablet 15 mg  15 mg Oral DAILY    buPROPion SR (WELLBUTRIN SR) tablet 150 mg  150 mg Oral QHS    carbidopa-levodopa (SINEMET)  mg per tablet 1 Tablet  1 Tablet Oral DAILY    clopidogreL (PLAVIX) tablet 75 mg  75 mg Oral DAILY    escitalopram oxalate (LEXAPRO) tablet 20 mg  20 mg Oral DAILY    pantoprazole (PROTONIX) tablet 40 mg  40 mg Oral ACB    acetaminophen (TYLENOL) tablet 650 mg  650 mg Oral Q4H PRN    Or    acetaminophen (TYLENOL) solution 650 mg  650 mg Per NG tube Q4H PRN    Or    acetaminophen (TYLENOL) suppository 650 mg  650 mg Rectal Q4H PRN    ondansetron (ZOFRAN) injection 4 mg  4 mg IntraVENous Q6H PRN    aspirin chewable tablet 81 mg  81 mg Oral DAILY    bisacodyL (DULCOLAX) tablet 5 mg  5 mg Oral DAILY PRN    enoxaparin (LOVENOX) injection 40 mg  40 mg SubCUTAneous Q24H    buPROPion SR (WELLBUTRIN SR) tablet 300 mg  300 mg Oral DAILY    atorvastatin (LIPITOR) tablet 40 mg  40 mg Oral QHS    melatonin tablet 3 mg  3 mg Oral QHS PRN     ______________________________________________________________________  EXPECTED LENGTH OF STAY: 4d 12h  ACTUAL LENGTH OF STAY:          2                 Jono Navarro MD ambulatory

## 2024-08-20 ENCOUNTER — HOSPITAL ENCOUNTER (OUTPATIENT)
Facility: HOSPITAL | Age: 74
Discharge: HOME | End: 2024-08-23
Payer: MEDICARE

## 2024-08-20 VITALS
TEMPERATURE: 98.6 F | WEIGHT: 176.37 LBS | SYSTOLIC BLOOD PRESSURE: 113 MMHG | OXYGEN SATURATION: 98 % | HEIGHT: 64 IN | BODY MASS INDEX: 30.11 KG/M2 | HEART RATE: 64 BPM | RESPIRATION RATE: 16 BRPM | DIASTOLIC BLOOD PRESSURE: 56 MMHG

## 2024-08-20 LAB
ABO + RH BLD: NORMAL
ALBUMIN SERPL-MCNC: 3.4 G/DL (ref 3.5–5)
ALBUMIN/GLOB SERPL: 0.9 (ref 1.1–2.2)
ALP SERPL-CCNC: 49 U/L (ref 45–117)
ALT SERPL-CCNC: 28 U/L (ref 12–78)
ANION GAP SERPL CALC-SCNC: 7 MMOL/L (ref 5–15)
APPEARANCE UR: ABNORMAL
AST SERPL-CCNC: 27 U/L (ref 15–37)
BACTERIA URNS QL MICRO: ABNORMAL /HPF
BILIRUB SERPL-MCNC: 0.3 MG/DL (ref 0.2–1)
BILIRUB UR QL: NEGATIVE
BLOOD GROUP ANTIBODIES SERPL: NORMAL
BUN SERPL-MCNC: 19 MG/DL (ref 6–20)
BUN/CREAT SERPL: 14 (ref 12–20)
CALCIUM SERPL-MCNC: 8.8 MG/DL (ref 8.5–10.1)
CHLORIDE SERPL-SCNC: 109 MMOL/L (ref 97–108)
CO2 SERPL-SCNC: 24 MMOL/L (ref 21–32)
COLOR UR: ABNORMAL
CREAT SERPL-MCNC: 1.34 MG/DL (ref 0.55–1.02)
EPITH CASTS URNS QL MICRO: ABNORMAL /LPF
ERYTHROCYTE [DISTWIDTH] IN BLOOD BY AUTOMATED COUNT: 14.6 % (ref 11.5–14.5)
GLOBULIN SER CALC-MCNC: 3.6 G/DL (ref 2–4)
GLUCOSE SERPL-MCNC: 108 MG/DL (ref 65–100)
GLUCOSE UR STRIP.AUTO-MCNC: NEGATIVE MG/DL
HCT VFR BLD AUTO: 33.2 % (ref 35–47)
HGB BLD-MCNC: 10.7 G/DL (ref 11.5–16)
HGB UR QL STRIP: ABNORMAL
HYALINE CASTS URNS QL MICRO: ABNORMAL /LPF (ref 0–2)
INR PPP: 1 (ref 0.9–1.1)
KETONES UR QL STRIP.AUTO: NEGATIVE MG/DL
LEUKOCYTE ESTERASE UR QL STRIP.AUTO: ABNORMAL
MCH RBC QN AUTO: 29.8 PG (ref 26–34)
MCHC RBC AUTO-ENTMCNC: 32.2 G/DL (ref 30–36.5)
MCV RBC AUTO: 92.5 FL (ref 80–99)
NITRITE UR QL STRIP.AUTO: NEGATIVE
NRBC # BLD: 0 K/UL (ref 0–0.01)
NRBC BLD-RTO: 0 PER 100 WBC
PH UR STRIP: 5.5 (ref 5–8)
PLATELET # BLD AUTO: 260 K/UL (ref 150–400)
PMV BLD AUTO: 9.5 FL (ref 8.9–12.9)
POTASSIUM SERPL-SCNC: 3.3 MMOL/L (ref 3.5–5.1)
PROT SERPL-MCNC: 7 G/DL (ref 6.4–8.2)
PROT UR STRIP-MCNC: NEGATIVE MG/DL
PROTHROMBIN TIME: 10.4 SEC (ref 9–11.1)
RBC # BLD AUTO: 3.59 M/UL (ref 3.8–5.2)
RBC #/AREA URNS HPF: ABNORMAL /HPF (ref 0–5)
SODIUM SERPL-SCNC: 140 MMOL/L (ref 136–145)
SP GR UR REFRACTOMETRY: 1.01
SPECIMEN EXP DATE BLD: NORMAL
URINE CULTURE IF INDICATED: ABNORMAL
UROBILINOGEN UR QL STRIP.AUTO: 1 EU/DL (ref 0.2–1)
WBC # BLD AUTO: 6.3 K/UL (ref 3.6–11)
WBC URNS QL MICRO: ABNORMAL /HPF (ref 0–4)

## 2024-08-20 PROCEDURE — 36415 COLL VENOUS BLD VENIPUNCTURE: CPT

## 2024-08-20 PROCEDURE — 80053 COMPREHEN METABOLIC PANEL: CPT

## 2024-08-20 PROCEDURE — 93005 ELECTROCARDIOGRAM TRACING: CPT | Performed by: SURGERY

## 2024-08-20 PROCEDURE — 87088 URINE BACTERIA CULTURE: CPT

## 2024-08-20 PROCEDURE — 81001 URINALYSIS AUTO W/SCOPE: CPT

## 2024-08-20 PROCEDURE — 86901 BLOOD TYPING SEROLOGIC RH(D): CPT

## 2024-08-20 PROCEDURE — 86900 BLOOD TYPING SEROLOGIC ABO: CPT

## 2024-08-20 PROCEDURE — 87086 URINE CULTURE/COLONY COUNT: CPT

## 2024-08-20 PROCEDURE — 85610 PROTHROMBIN TIME: CPT

## 2024-08-20 PROCEDURE — 86850 RBC ANTIBODY SCREEN: CPT

## 2024-08-20 PROCEDURE — 85027 COMPLETE CBC AUTOMATED: CPT

## 2024-08-20 PROCEDURE — 71046 X-RAY EXAM CHEST 2 VIEWS: CPT

## 2024-08-20 PROCEDURE — 87186 SC STD MICRODIL/AGAR DIL: CPT

## 2024-08-20 RX ORDER — ASPIRIN 81 MG/1
81 TABLET ORAL DAILY
COMMUNITY

## 2024-08-20 RX ORDER — SODIUM CHLORIDE, SODIUM LACTATE, POTASSIUM CHLORIDE, CALCIUM CHLORIDE 600; 310; 30; 20 MG/100ML; MG/100ML; MG/100ML; MG/100ML
INJECTION, SOLUTION INTRAVENOUS CONTINUOUS
Status: CANCELLED | OUTPATIENT
Start: 2024-08-23

## 2024-08-20 ASSESSMENT — PAIN DESCRIPTION - LOCATION: LOCATION: KNEE

## 2024-08-20 ASSESSMENT — PAIN DESCRIPTION - DESCRIPTORS: DESCRIPTORS: ACHING

## 2024-08-20 ASSESSMENT — PAIN DESCRIPTION - ORIENTATION: ORIENTATION: RIGHT

## 2024-08-20 ASSESSMENT — PAIN SCALES - GENERAL: PAINLEVEL_OUTOF10: 6

## 2024-08-22 LAB
BACTERIA SPEC CULT: ABNORMAL
CC UR VC: ABNORMAL
EKG ATRIAL RATE: 63 BPM
EKG DIAGNOSIS: NORMAL
EKG P AXIS: 82 DEGREES
EKG P-R INTERVAL: 232 MS
EKG Q-T INTERVAL: 456 MS
EKG QRS DURATION: 88 MS
EKG QTC CALCULATION (BAZETT): 466 MS
EKG R AXIS: 25 DEGREES
EKG T AXIS: 54 DEGREES
EKG VENTRICULAR RATE: 63 BPM
SERVICE CMNT-IMP: ABNORMAL

## 2024-08-22 NOTE — PERIOP NOTE
Multiple attempts made to get CXR read.  Xray department called this am as well as the xray escalation line.  Still at 1455 not read.

## 2024-08-22 NOTE — PROGRESS NOTES
Faxed/Called and spoke to Courtney regarding final urine culture results to Dr Dewey/NP to review. Faxed confirmed.     Informed Courtney that CXR results have not been read yet and we have called multiple times. She will let Dr Dewey and NP aware.     8/22/2024 1532 pm Per Courtney at Dr Dewey's office, he will treat urinary tract infection tomorrow when she comes in for surgery. Dr Dewey is aware about CXR not being read. Okay to proceed with surgery.

## 2024-08-23 ENCOUNTER — HOSPITAL ENCOUNTER (INPATIENT)
Facility: HOSPITAL | Age: 74
LOS: 1 days | Discharge: HOME | DRG: 301 | End: 2024-08-24
Attending: SURGERY | Admitting: SURGERY
Payer: MEDICARE

## 2024-08-23 ENCOUNTER — ANESTHESIA (OUTPATIENT)
Facility: HOSPITAL | Age: 74
End: 2024-08-23
Payer: MEDICARE

## 2024-08-23 ENCOUNTER — ANESTHESIA EVENT (OUTPATIENT)
Facility: HOSPITAL | Age: 74
End: 2024-08-23
Payer: MEDICARE

## 2024-08-23 DIAGNOSIS — I70.229 CRITICAL LOWER LIMB ISCHEMIA (HCC): Primary | ICD-10-CM

## 2024-08-23 LAB
ANION GAP BLD CALC-SCNC: 10.5 MMOL/L (ref 10–20)
CA-I BLD-MCNC: 1.22 MMOL/L (ref 1.12–1.32)
CHLORIDE BLD-SCNC: 108 MMOL/L (ref 98–107)
CO2 BLD-SCNC: 25.5 MMOL/L (ref 21–32)
CREAT BLD-MCNC: 0.95 MG/DL (ref 0.6–1.3)
GLUCOSE BLD-MCNC: 126 MG/DL (ref 70–110)
POTASSIUM BLD-SCNC: 3.3 MMOL/L (ref 3.5–5.1)
SERVICE CMNT-IMP: ABNORMAL
SODIUM BLD-SCNC: 144 MMOL/L (ref 136–145)

## 2024-08-23 PROCEDURE — 7100000001 HC PACU RECOVERY - ADDTL 15 MIN: Performed by: SURGERY

## 2024-08-23 PROCEDURE — 6370000000 HC RX 637 (ALT 250 FOR IP): Performed by: ANESTHESIOLOGY

## 2024-08-23 PROCEDURE — 3600000004 HC SURGERY LEVEL 4 BASE: Performed by: SURGERY

## 2024-08-23 PROCEDURE — 04UK0KZ SUPPLEMENT RIGHT FEMORAL ARTERY WITH NONAUTOLOGOUS TISSUE SUBSTITUTE, OPEN APPROACH: ICD-10-PCS | Performed by: SURGERY

## 2024-08-23 PROCEDURE — 6360000002 HC RX W HCPCS: Performed by: NURSE ANESTHETIST, CERTIFIED REGISTERED

## 2024-08-23 PROCEDURE — 2580000003 HC RX 258: Performed by: SURGERY

## 2024-08-23 PROCEDURE — 04CK0ZZ EXTIRPATION OF MATTER FROM RIGHT FEMORAL ARTERY, OPEN APPROACH: ICD-10-PCS | Performed by: SURGERY

## 2024-08-23 PROCEDURE — 80047 BASIC METABLC PNL IONIZED CA: CPT

## 2024-08-23 PROCEDURE — 2720000010 HC SURG SUPPLY STERILE: Performed by: SURGERY

## 2024-08-23 PROCEDURE — 2709999900 HC NON-CHARGEABLE SUPPLY: Performed by: SURGERY

## 2024-08-23 PROCEDURE — 2580000003 HC RX 258: Performed by: STUDENT IN AN ORGANIZED HEALTH CARE EDUCATION/TRAINING PROGRAM

## 2024-08-23 PROCEDURE — 2500000003 HC RX 250 WO HCPCS: Performed by: NURSE ANESTHETIST, CERTIFIED REGISTERED

## 2024-08-23 PROCEDURE — 6360000002 HC RX W HCPCS: Performed by: SURGERY

## 2024-08-23 PROCEDURE — 2580000003 HC RX 258: Performed by: NURSE ANESTHETIST, CERTIFIED REGISTERED

## 2024-08-23 PROCEDURE — 7100000000 HC PACU RECOVERY - FIRST 15 MIN: Performed by: SURGERY

## 2024-08-23 PROCEDURE — 6370000000 HC RX 637 (ALT 250 FOR IP): Performed by: SURGERY

## 2024-08-23 PROCEDURE — 3700000001 HC ADD 15 MINUTES (ANESTHESIA): Performed by: SURGERY

## 2024-08-23 PROCEDURE — 2060000000 HC ICU INTERMEDIATE R&B

## 2024-08-23 PROCEDURE — C1768 GRAFT, VASCULAR: HCPCS | Performed by: SURGERY

## 2024-08-23 PROCEDURE — 3700000000 HC ANESTHESIA ATTENDED CARE: Performed by: SURGERY

## 2024-08-23 PROCEDURE — 2580000003 HC RX 258

## 2024-08-23 PROCEDURE — 3600000014 HC SURGERY LEVEL 4 ADDTL 15MIN: Performed by: SURGERY

## 2024-08-23 DEVICE — PATCH VASC W0.8XL8CM PERIPH BOV PERICARD N PVC N DEHP CRSS: Type: IMPLANTABLE DEVICE | Site: GROIN | Status: FUNCTIONAL

## 2024-08-23 RX ORDER — FENTANYL CITRATE 50 UG/ML
25 INJECTION, SOLUTION INTRAMUSCULAR; INTRAVENOUS EVERY 5 MIN PRN
Status: DISCONTINUED | OUTPATIENT
Start: 2024-08-23 | End: 2024-08-23 | Stop reason: HOSPADM

## 2024-08-23 RX ORDER — MIDAZOLAM HYDROCHLORIDE 2 MG/2ML
2 INJECTION, SOLUTION INTRAMUSCULAR; INTRAVENOUS
Status: DISCONTINUED | OUTPATIENT
Start: 2024-08-23 | End: 2024-08-23 | Stop reason: HOSPADM

## 2024-08-23 RX ORDER — HYDROMORPHONE HYDROCHLORIDE 1 MG/ML
0.25 INJECTION, SOLUTION INTRAMUSCULAR; INTRAVENOUS; SUBCUTANEOUS EVERY 5 MIN PRN
Status: DISCONTINUED | OUTPATIENT
Start: 2024-08-23 | End: 2024-08-23 | Stop reason: HOSPADM

## 2024-08-23 RX ORDER — CYCLOBENZAPRINE HCL 10 MG
10 TABLET ORAL 2 TIMES DAILY PRN
Status: DISCONTINUED | OUTPATIENT
Start: 2024-08-23 | End: 2024-08-24 | Stop reason: HOSPADM

## 2024-08-23 RX ORDER — CLOPIDOGREL BISULFATE 75 MG/1
75 TABLET ORAL DAILY
Status: DISCONTINUED | OUTPATIENT
Start: 2024-08-24 | End: 2024-08-24 | Stop reason: HOSPADM

## 2024-08-23 RX ORDER — ASPIRIN 81 MG/1
81 TABLET ORAL DAILY
Status: DISCONTINUED | OUTPATIENT
Start: 2024-08-23 | End: 2024-08-24 | Stop reason: HOSPADM

## 2024-08-23 RX ORDER — SODIUM CHLORIDE 0.9 % (FLUSH) 0.9 %
5-40 SYRINGE (ML) INJECTION EVERY 12 HOURS SCHEDULED
Status: DISCONTINUED | OUTPATIENT
Start: 2024-08-23 | End: 2024-08-23 | Stop reason: HOSPADM

## 2024-08-23 RX ORDER — HYDROMORPHONE HYDROCHLORIDE 1 MG/ML
1 INJECTION, SOLUTION INTRAMUSCULAR; INTRAVENOUS; SUBCUTANEOUS
Status: DISCONTINUED | OUTPATIENT
Start: 2024-08-23 | End: 2024-08-24 | Stop reason: HOSPADM

## 2024-08-23 RX ORDER — SODIUM CHLORIDE, SODIUM LACTATE, POTASSIUM CHLORIDE, CALCIUM CHLORIDE 600; 310; 30; 20 MG/100ML; MG/100ML; MG/100ML; MG/100ML
INJECTION, SOLUTION INTRAVENOUS CONTINUOUS PRN
Status: DISCONTINUED | OUTPATIENT
Start: 2024-08-23 | End: 2024-08-23 | Stop reason: SDUPTHER

## 2024-08-23 RX ORDER — ACETAMINOPHEN 325 MG/1
650 TABLET ORAL EVERY 4 HOURS PRN
Status: DISCONTINUED | OUTPATIENT
Start: 2024-08-23 | End: 2024-08-24 | Stop reason: HOSPADM

## 2024-08-23 RX ORDER — SODIUM CHLORIDE 9 MG/ML
INJECTION, SOLUTION INTRAVENOUS CONTINUOUS
Status: DISCONTINUED | OUTPATIENT
Start: 2024-08-23 | End: 2024-08-24 | Stop reason: HOSPADM

## 2024-08-23 RX ORDER — EPHEDRINE SULFATE/0.9% NACL/PF 25 MG/5 ML
SYRINGE (ML) INTRAVENOUS PRN
Status: DISCONTINUED | OUTPATIENT
Start: 2024-08-23 | End: 2024-08-23 | Stop reason: SDUPTHER

## 2024-08-23 RX ORDER — PROTAMINE SULFATE 10 MG/ML
INJECTION, SOLUTION INTRAVENOUS PRN
Status: DISCONTINUED | OUTPATIENT
Start: 2024-08-23 | End: 2024-08-23 | Stop reason: SDUPTHER

## 2024-08-23 RX ORDER — HEPARIN SODIUM 5000 [USP'U]/ML
INJECTION, SOLUTION INTRAVENOUS; SUBCUTANEOUS PRN
Status: DISCONTINUED | OUTPATIENT
Start: 2024-08-23 | End: 2024-08-23 | Stop reason: SDUPTHER

## 2024-08-23 RX ORDER — ACETAMINOPHEN 325 MG/1
650 TABLET ORAL
Status: DISCONTINUED | OUTPATIENT
Start: 2024-08-23 | End: 2024-08-23 | Stop reason: HOSPADM

## 2024-08-23 RX ORDER — HYDROMORPHONE HYDROCHLORIDE 1 MG/ML
0.5 INJECTION, SOLUTION INTRAMUSCULAR; INTRAVENOUS; SUBCUTANEOUS
Status: DISCONTINUED | OUTPATIENT
Start: 2024-08-23 | End: 2024-08-24 | Stop reason: HOSPADM

## 2024-08-23 RX ORDER — SODIUM CHLORIDE 9 MG/ML
INJECTION, SOLUTION INTRAVENOUS PRN
Status: DISCONTINUED | OUTPATIENT
Start: 2024-08-23 | End: 2024-08-23 | Stop reason: HOSPADM

## 2024-08-23 RX ORDER — ROCURONIUM BROMIDE 10 MG/ML
INJECTION, SOLUTION INTRAVENOUS PRN
Status: DISCONTINUED | OUTPATIENT
Start: 2024-08-23 | End: 2024-08-23 | Stop reason: SDUPTHER

## 2024-08-23 RX ORDER — GLYCOPYRROLATE 0.2 MG/ML
INJECTION INTRAMUSCULAR; INTRAVENOUS PRN
Status: DISCONTINUED | OUTPATIENT
Start: 2024-08-23 | End: 2024-08-23 | Stop reason: SDUPTHER

## 2024-08-23 RX ORDER — ACETAMINOPHEN 500 MG
1000 TABLET ORAL ONCE
Status: COMPLETED | OUTPATIENT
Start: 2024-08-23 | End: 2024-08-23

## 2024-08-23 RX ORDER — ETOMIDATE 2 MG/ML
INJECTION INTRAVENOUS PRN
Status: DISCONTINUED | OUTPATIENT
Start: 2024-08-23 | End: 2024-08-23 | Stop reason: SDUPTHER

## 2024-08-23 RX ORDER — PROCHLORPERAZINE EDISYLATE 5 MG/ML
5 INJECTION INTRAMUSCULAR; INTRAVENOUS
Status: DISCONTINUED | OUTPATIENT
Start: 2024-08-23 | End: 2024-08-23 | Stop reason: HOSPADM

## 2024-08-23 RX ORDER — OXYCODONE HYDROCHLORIDE 5 MG/1
5 TABLET ORAL
Status: DISCONTINUED | OUTPATIENT
Start: 2024-08-23 | End: 2024-08-23 | Stop reason: HOSPADM

## 2024-08-23 RX ORDER — NALOXONE HYDROCHLORIDE 0.4 MG/ML
INJECTION, SOLUTION INTRAMUSCULAR; INTRAVENOUS; SUBCUTANEOUS PRN
Status: DISCONTINUED | OUTPATIENT
Start: 2024-08-23 | End: 2024-08-23 | Stop reason: HOSPADM

## 2024-08-23 RX ORDER — BISACODYL 10 MG
10 SUPPOSITORY, RECTAL RECTAL DAILY PRN
Status: DISCONTINUED | OUTPATIENT
Start: 2024-08-23 | End: 2024-08-24 | Stop reason: HOSPADM

## 2024-08-23 RX ORDER — DEXAMETHASONE SODIUM PHOSPHATE 4 MG/ML
INJECTION, SOLUTION INTRA-ARTICULAR; INTRALESIONAL; INTRAMUSCULAR; INTRAVENOUS; SOFT TISSUE PRN
Status: DISCONTINUED | OUTPATIENT
Start: 2024-08-23 | End: 2024-08-23 | Stop reason: SDUPTHER

## 2024-08-23 RX ORDER — FENTANYL CITRATE 50 UG/ML
100 INJECTION, SOLUTION INTRAMUSCULAR; INTRAVENOUS
Status: DISCONTINUED | OUTPATIENT
Start: 2024-08-23 | End: 2024-08-23 | Stop reason: HOSPADM

## 2024-08-23 RX ORDER — ONDANSETRON 2 MG/ML
INJECTION INTRAMUSCULAR; INTRAVENOUS PRN
Status: DISCONTINUED | OUTPATIENT
Start: 2024-08-23 | End: 2024-08-23 | Stop reason: SDUPTHER

## 2024-08-23 RX ORDER — MIDAZOLAM HYDROCHLORIDE 1 MG/ML
INJECTION INTRAMUSCULAR; INTRAVENOUS PRN
Status: DISCONTINUED | OUTPATIENT
Start: 2024-08-23 | End: 2024-08-23 | Stop reason: SDUPTHER

## 2024-08-23 RX ORDER — BUPROPION HYDROCHLORIDE 150 MG/1
150 TABLET, EXTENDED RELEASE ORAL DAILY
Status: DISCONTINUED | OUTPATIENT
Start: 2024-08-24 | End: 2024-08-24

## 2024-08-23 RX ORDER — HYDROMORPHONE HYDROCHLORIDE 2 MG/ML
INJECTION, SOLUTION INTRAMUSCULAR; INTRAVENOUS; SUBCUTANEOUS PRN
Status: DISCONTINUED | OUTPATIENT
Start: 2024-08-23 | End: 2024-08-23 | Stop reason: SDUPTHER

## 2024-08-23 RX ORDER — EPHEDRINE SULFATE/0.9% NACL/PF 50 MG/5 ML
SYRINGE (ML) INTRAVENOUS PRN
Status: DISCONTINUED | OUTPATIENT
Start: 2024-08-23 | End: 2024-08-23 | Stop reason: SDUPTHER

## 2024-08-23 RX ORDER — SODIUM CHLORIDE 0.9 % (FLUSH) 0.9 %
5-40 SYRINGE (ML) INJECTION PRN
Status: DISCONTINUED | OUTPATIENT
Start: 2024-08-23 | End: 2024-08-23 | Stop reason: HOSPADM

## 2024-08-23 RX ORDER — CARBIDOPA AND LEVODOPA 25; 100 MG/1; MG/1
1 TABLET ORAL 2 TIMES DAILY
Status: DISCONTINUED | OUTPATIENT
Start: 2024-08-23 | End: 2024-08-24 | Stop reason: HOSPADM

## 2024-08-23 RX ORDER — FENTANYL CITRATE 50 UG/ML
INJECTION, SOLUTION INTRAMUSCULAR; INTRAVENOUS PRN
Status: DISCONTINUED | OUTPATIENT
Start: 2024-08-23 | End: 2024-08-23 | Stop reason: SDUPTHER

## 2024-08-23 RX ORDER — HYDRALAZINE HYDROCHLORIDE 20 MG/ML
10 INJECTION INTRAMUSCULAR; INTRAVENOUS
Status: DISCONTINUED | OUTPATIENT
Start: 2024-08-23 | End: 2024-08-23 | Stop reason: HOSPADM

## 2024-08-23 RX ORDER — DEXAMETHASONE SODIUM PHOSPHATE 4 MG/ML
4 INJECTION, SOLUTION INTRA-ARTICULAR; INTRALESIONAL; INTRAMUSCULAR; INTRAVENOUS; SOFT TISSUE
Status: DISCONTINUED | OUTPATIENT
Start: 2024-08-23 | End: 2024-08-23 | Stop reason: HOSPADM

## 2024-08-23 RX ORDER — ESCITALOPRAM OXALATE 10 MG/1
20 TABLET ORAL DAILY
Status: DISCONTINUED | OUTPATIENT
Start: 2024-08-23 | End: 2024-08-24 | Stop reason: HOSPADM

## 2024-08-23 RX ORDER — PANTOPRAZOLE SODIUM 40 MG/1
40 TABLET, DELAYED RELEASE ORAL
Status: DISCONTINUED | OUTPATIENT
Start: 2024-08-24 | End: 2024-08-24 | Stop reason: HOSPADM

## 2024-08-23 RX ORDER — OXYCODONE AND ACETAMINOPHEN 5; 325 MG/1; MG/1
1 TABLET ORAL EVERY 4 HOURS PRN
Status: DISCONTINUED | OUTPATIENT
Start: 2024-08-23 | End: 2024-08-24 | Stop reason: HOSPADM

## 2024-08-23 RX ORDER — ARIPIPRAZOLE 5 MG/1
15 TABLET ORAL NIGHTLY
Status: DISCONTINUED | OUTPATIENT
Start: 2024-08-23 | End: 2024-08-24 | Stop reason: HOSPADM

## 2024-08-23 RX ORDER — ONDANSETRON 2 MG/ML
4 INJECTION INTRAMUSCULAR; INTRAVENOUS ONCE
Status: DISCONTINUED | OUTPATIENT
Start: 2024-08-23 | End: 2024-08-23 | Stop reason: HOSPADM

## 2024-08-23 RX ORDER — METOPROLOL TARTRATE 25 MG/1
25 TABLET, FILM COATED ORAL 2 TIMES DAILY
Status: DISCONTINUED | OUTPATIENT
Start: 2024-08-23 | End: 2024-08-24 | Stop reason: HOSPADM

## 2024-08-23 RX ORDER — ONDANSETRON 2 MG/ML
4 INJECTION INTRAMUSCULAR; INTRAVENOUS EVERY 6 HOURS PRN
Status: DISCONTINUED | OUTPATIENT
Start: 2024-08-23 | End: 2024-08-23 | Stop reason: HOSPADM

## 2024-08-23 RX ORDER — SODIUM CHLORIDE, SODIUM LACTATE, POTASSIUM CHLORIDE, CALCIUM CHLORIDE 600; 310; 30; 20 MG/100ML; MG/100ML; MG/100ML; MG/100ML
INJECTION, SOLUTION INTRAVENOUS CONTINUOUS
Status: DISCONTINUED | OUTPATIENT
Start: 2024-08-23 | End: 2024-08-23 | Stop reason: HOSPADM

## 2024-08-23 RX ORDER — PROPOFOL 10 MG/ML
INJECTION, EMULSION INTRAVENOUS PRN
Status: DISCONTINUED | OUTPATIENT
Start: 2024-08-23 | End: 2024-08-23 | Stop reason: SDUPTHER

## 2024-08-23 RX ADMIN — CARBIDOPA AND LEVODOPA 1 TABLET: 25; 100 TABLET ORAL at 22:11

## 2024-08-23 RX ADMIN — HYDROMORPHONE HYDROCHLORIDE 0.4 MG: 2 INJECTION INTRAMUSCULAR; INTRAVENOUS; SUBCUTANEOUS at 12:01

## 2024-08-23 RX ADMIN — MIDAZOLAM HYDROCHLORIDE 1 MG: 1 INJECTION, SOLUTION INTRAMUSCULAR; INTRAVENOUS at 11:08

## 2024-08-23 RX ADMIN — HYDROMORPHONE HYDROCHLORIDE 1 MG: 2 INJECTION INTRAMUSCULAR; INTRAVENOUS; SUBCUTANEOUS at 13:04

## 2024-08-23 RX ADMIN — HEPARIN SODIUM 2500 UNITS: 5000 INJECTION INTRAVENOUS; SUBCUTANEOUS at 12:58

## 2024-08-23 RX ADMIN — HEPARIN SODIUM 7500 UNITS: 5000 INJECTION INTRAVENOUS; SUBCUTANEOUS at 12:05

## 2024-08-23 RX ADMIN — EPHEDRINE SULFATE 10 MG: 5 INJECTION INTRAVENOUS at 11:26

## 2024-08-23 RX ADMIN — Medication 3 AMPULE: at 08:51

## 2024-08-23 RX ADMIN — ACETAMINOPHEN 1000 MG: 500 TABLET ORAL at 08:51

## 2024-08-23 RX ADMIN — ARIPIPRAZOLE 15 MG: 5 TABLET ORAL at 22:14

## 2024-08-23 RX ADMIN — PROPOFOL 50 MG: 10 INJECTION, EMULSION INTRAVENOUS at 11:21

## 2024-08-23 RX ADMIN — ETOMIDATE 10 MG: 2 INJECTION INTRAVENOUS at 11:21

## 2024-08-23 RX ADMIN — SUGAMMADEX 200 MG: 100 INJECTION, SOLUTION INTRAVENOUS at 14:03

## 2024-08-23 RX ADMIN — ROCURONIUM BROMIDE 50 MG: 10 INJECTION INTRAVENOUS at 11:21

## 2024-08-23 RX ADMIN — SODIUM CHLORIDE, POTASSIUM CHLORIDE, SODIUM LACTATE AND CALCIUM CHLORIDE: 600; 310; 30; 20 INJECTION, SOLUTION INTRAVENOUS at 08:51

## 2024-08-23 RX ADMIN — PROTAMINE SULFATE 25 MG: 10 INJECTION, SOLUTION INTRAVENOUS at 13:44

## 2024-08-23 RX ADMIN — Medication 10 MG: at 11:27

## 2024-08-23 RX ADMIN — NITROGLYCERIN 2 ML: 200 INJECTION, SOLUTION INTRAVENOUS at 12:02

## 2024-08-23 RX ADMIN — HYDROMORPHONE HYDROCHLORIDE 0.6 MG: 2 INJECTION INTRAMUSCULAR; INTRAVENOUS; SUBCUTANEOUS at 11:44

## 2024-08-23 RX ADMIN — MIDAZOLAM HYDROCHLORIDE 1 MG: 1 INJECTION, SOLUTION INTRAMUSCULAR; INTRAVENOUS at 11:15

## 2024-08-23 RX ADMIN — PHENYLEPHRINE HYDROCHLORIDE 40 MCG: 10 INJECTION INTRAVENOUS at 12:42

## 2024-08-23 RX ADMIN — PROPOFOL 20 MG: 10 INJECTION, EMULSION INTRAVENOUS at 12:31

## 2024-08-23 RX ADMIN — ROCURONIUM BROMIDE 20 MG: 10 INJECTION INTRAVENOUS at 13:36

## 2024-08-23 RX ADMIN — SODIUM CHLORIDE, POTASSIUM CHLORIDE, SODIUM LACTATE AND CALCIUM CHLORIDE: 600; 310; 30; 20 INJECTION, SOLUTION INTRAVENOUS at 11:08

## 2024-08-23 RX ADMIN — PROPOFOL 50 MG: 10 INJECTION, EMULSION INTRAVENOUS at 12:35

## 2024-08-23 RX ADMIN — METOPROLOL TARTRATE 25 MG: 25 TABLET, FILM COATED ORAL at 22:15

## 2024-08-23 RX ADMIN — PROPOFOL 50 MG: 10 INJECTION, EMULSION INTRAVENOUS at 12:01

## 2024-08-23 RX ADMIN — FENTANYL CITRATE 100 MCG: 50 INJECTION, SOLUTION INTRAMUSCULAR; INTRAVENOUS at 11:21

## 2024-08-23 RX ADMIN — ROCURONIUM BROMIDE 10 MG: 10 INJECTION INTRAVENOUS at 12:29

## 2024-08-23 RX ADMIN — WATER 2000 MG: 1 INJECTION INTRAMUSCULAR; INTRAVENOUS; SUBCUTANEOUS at 11:26

## 2024-08-23 RX ADMIN — PHENYLEPHRINE HYDROCHLORIDE 35 MCG/MIN: 10 INJECTION INTRAVENOUS at 11:50

## 2024-08-23 RX ADMIN — GLYCOPYRROLATE 0.2 MG: 0.2 INJECTION INTRAMUSCULAR; INTRAVENOUS at 11:37

## 2024-08-23 RX ADMIN — DEXAMETHASONE SODIUM PHOSPHATE 8 MG: 4 INJECTION INTRA-ARTICULAR; INTRALESIONAL; INTRAMUSCULAR; INTRAVENOUS; SOFT TISSUE at 11:31

## 2024-08-23 RX ADMIN — SODIUM CHLORIDE: 9 INJECTION, SOLUTION INTRAVENOUS at 18:16

## 2024-08-23 RX ADMIN — OXYCODONE HYDROCHLORIDE AND ACETAMINOPHEN 1 TABLET: 5; 325 TABLET ORAL at 22:11

## 2024-08-23 RX ADMIN — ROCURONIUM BROMIDE 10 MG: 10 INJECTION INTRAVENOUS at 12:36

## 2024-08-23 RX ADMIN — ONDANSETRON 4 MG: 2 INJECTION INTRAMUSCULAR; INTRAVENOUS at 13:45

## 2024-08-23 RX ADMIN — EPHEDRINE SULFATE 10 MG: 5 INJECTION INTRAVENOUS at 11:50

## 2024-08-23 RX ADMIN — PHENYLEPHRINE HYDROCHLORIDE 40 MCG: 10 INJECTION INTRAVENOUS at 12:57

## 2024-08-23 ASSESSMENT — PAIN SCALES - GENERAL
PAINLEVEL_OUTOF10: 6
PAINLEVEL_OUTOF10: 0
PAINLEVEL_OUTOF10: 0

## 2024-08-23 ASSESSMENT — ENCOUNTER SYMPTOMS: SHORTNESS OF BREATH: 1

## 2024-08-23 ASSESSMENT — PAIN DESCRIPTION - LOCATION: LOCATION: INCISION;GROIN

## 2024-08-23 ASSESSMENT — PAIN - FUNCTIONAL ASSESSMENT: PAIN_FUNCTIONAL_ASSESSMENT: NONE - DENIES PAIN

## 2024-08-23 ASSESSMENT — PAIN DESCRIPTION - ORIENTATION: ORIENTATION: RIGHT

## 2024-08-23 ASSESSMENT — PAIN DESCRIPTION - DESCRIPTORS: DESCRIPTORS: ACHING

## 2024-08-23 NOTE — ANESTHESIA PRE PROCEDURE
Department of Anesthesiology  Preprocedure Note       Name:  Melyssa Mcarthur   Age:  73 y.o.  :  1950                                          MRN:  [id number]         Date:  2024      Surgeon: Surgeon(s):  Dong Dewey MD    Procedure: Procedure(s):  RIGHT FEMORAL ENDARTERECTOMY    Medications prior to admission:   Prior to Admission medications    Medication Sig Start Date End Date Taking? Authorizing Provider   aspirin 81 MG EC tablet Take 1 tablet by mouth daily    Provider, MD Alba   ARIPiprazole (ABILIFY) 15 MG tablet nightly. 1/3/23   Automatic Reconciliation, Ar   buPROPion (WELLBUTRIN SR) 150 MG extended release tablet Take by mouth daily 1/3/23   Automatic Reconciliation, Ar   carbidopa-levodopa (SINEMET)  MG per tablet Take 1 tablet by mouth 1/3/23   Automatic Reconciliation, Ar   clopidogrel (PLAVIX) 75 MG tablet Take by mouth daily 1/3/23   Automatic Reconciliation, Ar   cyclobenzaprine (FLEXERIL) 10 MG tablet Take by mouth 2 times daily as needed 22   Automatic Reconciliation, Ar   escitalopram (LEXAPRO) 20 MG tablet Take by mouth daily 23   Automatic Reconciliation, Ar   esomeprazole (NEXIUM) 40 MG delayed release capsule Take by mouth every morning (before breakfast) 23   Automatic Reconciliation, Ar   fexofenadine (ALLEGRA) 180 MG tablet Take by mouth daily    Automatic Reconciliation, Ar   gabapentin (NEURONTIN) 600 MG tablet Take by mouth daily as needed.    Automatic Reconciliation, Ar   metoprolol tartrate (LOPRESSOR) 25 MG tablet Take by mouth 2 times daily 22   Automatic Reconciliation, Ar       Current medications:    No current facility-administered medications for this encounter.     Current Outpatient Medications   Medication Sig Dispense Refill   • aspirin 81 MG EC tablet Take 1 tablet by mouth daily     • ARIPiprazole (ABILIFY) 15 MG tablet nightly.     • buPROPion (WELLBUTRIN SR) 150 MG extended release tablet Take by mouth

## 2024-08-23 NOTE — ANESTHESIA POSTPROCEDURE EVALUATION
Department of Anesthesiology  Postprocedure Note    Patient: Melyssa Mcarthur  MRN: [id number]  YOB: 1950  Date of evaluation: 8/23/2024    Procedure Summary       Date: 08/23/24 Room / Location: [facility]    Anesthesia Start: 1108 Anesthesia Stop: 1420    Procedure: RIGHT FEMORAL ENDARTERECTOMY (Right: Groin) Diagnosis:       PAD (peripheral artery disease) ([facility])      (PAD (peripheral artery disease) ([facility]) [I73.9])    Providers: Dong Dewey MD Responsible Provider: Kaiden Lama MD    Anesthesia Type: general ASA Status: 3            Anesthesia Type: No value filed.    Toshia Phase I: Toshia Score: 9    Toshia Phase II:      Anesthesia Post Evaluation    Patient location during evaluation: PACU  Patient participation: complete - patient participated  Level of consciousness: awake  Airway patency: patent  Nausea & Vomiting: no vomiting  Cardiovascular status: hemodynamically stable  Respiratory status: acceptable  Hydration status: euvolemic    No notable events documented.

## 2024-08-23 NOTE — BRIEF OP NOTE
Brief Postoperative Note      Patient: Melyssa Mcarthur  YOB: 1950  MRN: [id number]    Date of Procedure: 2024    Pre-Op Diagnosis Codes:      * PAD (peripheral artery disease) ([facility]) [I73.9]    Post-Op Diagnosis: Same       Procedure(s):  RIGHT FEMORAL ENDARTERECTOMY    Surgeon(s):  Dong Dewey MD    Assistant:  Surgical Assistant: J Luis Velásquez    Anesthesia: General    Estimated Blood Loss (mL): 300     Complications: None    Specimens:   * No specimens in log *    Implants:  Implant Name Type Inv. Item Serial No.  Lot No. LRB No. Used Action   [id number] - [id number]   0.8 x 0.8 Vascular repair patch Vascular/Graft/Patch/Filter  175 [employer] [id number] Right 1 Implanted         Drains:   Urinary Catheter [address];Queen (Active)   Catheter Indications Perioperative use for selected surgical procedures 24 142   Urine Color Yellow 24 1425   Urine Appearance Clear 24 142   Collection Container Standard 24 142   Securement Method Securing device (Describe) 24 142   Catheter Best Practices  Drainage bag less than half full;Lack of dependent loop in tubing;Bag not on floor;Catheter secured to thigh;Drainage tube clipped to bed;Tamper seal intact;Bag below bladder 24 142   Status Patent;Draining 24 142       Findings:  Infection Present At Time Of Surgery (PATOS) (choose all levels that have infection present):  No infection present  Other Findings: Bulky near occlusive plaque in CFA and proximal SFA.  FEA from inguinal ligament to 4 cm into SFA.  Duplicate profunda.  Bovine patch angioplasty.  Good flow on completion.    Electronically signed by Dong eDwey MD on 2024 at 2:51 PM

## 2024-08-23 NOTE — PROGRESS NOTES
TRANSFER - IN REPORT:    Verbal report received from Luis Fernando Krishnaance ROGER Mcarthur  being received from PACU for routine progression of care. Report consisted of patient’s Situation, Background, Assessment and Recommendations(SBAR).  Opportunity for questions and clarification was provided. Assessment completed upon patient’s arrival to IVCU and care assumed.       PROCEDURE SITE CHECK:    Procedure site: Right groin site is CDI. Patient currently has no c/o pain/discomfort reported at procedure site.

## 2024-08-24 VITALS
TEMPERATURE: 97.7 F | RESPIRATION RATE: 16 BRPM | OXYGEN SATURATION: 97 % | DIASTOLIC BLOOD PRESSURE: 56 MMHG | SYSTOLIC BLOOD PRESSURE: 115 MMHG | HEIGHT: 64 IN | WEIGHT: 172.4 LBS | BODY MASS INDEX: 29.43 KG/M2 | HEART RATE: 64 BPM

## 2024-08-24 LAB
ANION GAP SERPL CALC-SCNC: 6 MMOL/L (ref 5–15)
BUN SERPL-MCNC: 16 MG/DL (ref 6–20)
BUN/CREAT SERPL: 13 (ref 12–20)
CALCIUM SERPL-MCNC: 8.2 MG/DL (ref 8.5–10.1)
CHLORIDE SERPL-SCNC: 108 MMOL/L (ref 97–108)
CO2 SERPL-SCNC: 26 MMOL/L (ref 21–32)
CREAT SERPL-MCNC: 1.19 MG/DL (ref 0.55–1.02)
ERYTHROCYTE [DISTWIDTH] IN BLOOD BY AUTOMATED COUNT: 14.8 % (ref 11.5–14.5)
GLUCOSE SERPL-MCNC: 149 MG/DL (ref 65–100)
HCT VFR BLD AUTO: 28.8 % (ref 35–47)
HGB BLD-MCNC: 9.3 G/DL (ref 11.5–16)
MCH RBC QN AUTO: 30 PG (ref 26–34)
MCHC RBC AUTO-ENTMCNC: 32.3 G/DL (ref 30–36.5)
MCV RBC AUTO: 92.9 FL (ref 80–99)
NRBC # BLD: 0 K/UL (ref 0–0.01)
NRBC BLD-RTO: 0 PER 100 WBC
PLATELET # BLD AUTO: 194 K/UL (ref 150–400)
PMV BLD AUTO: 9.3 FL (ref 8.9–12.9)
POTASSIUM SERPL-SCNC: 3.8 MMOL/L (ref 3.5–5.1)
RBC # BLD AUTO: 3.1 M/UL (ref 3.8–5.2)
SODIUM SERPL-SCNC: 140 MMOL/L (ref 136–145)
WBC # BLD AUTO: 7.7 K/UL (ref 3.6–11)

## 2024-08-24 PROCEDURE — 97535 SELF CARE MNGMENT TRAINING: CPT | Performed by: OCCUPATIONAL THERAPIST

## 2024-08-24 PROCEDURE — 97163 PT EVAL HIGH COMPLEX 45 MIN: CPT

## 2024-08-24 PROCEDURE — 6370000000 HC RX 637 (ALT 250 FOR IP): Performed by: SURGERY

## 2024-08-24 PROCEDURE — 97165 OT EVAL LOW COMPLEX 30 MIN: CPT | Performed by: OCCUPATIONAL THERAPIST

## 2024-08-24 PROCEDURE — 80048 BASIC METABOLIC PNL TOTAL CA: CPT

## 2024-08-24 PROCEDURE — 36415 COLL VENOUS BLD VENIPUNCTURE: CPT

## 2024-08-24 PROCEDURE — 85027 COMPLETE CBC AUTOMATED: CPT

## 2024-08-24 PROCEDURE — 97116 GAIT TRAINING THERAPY: CPT

## 2024-08-24 RX ORDER — BUPROPION HYDROCHLORIDE 150 MG/1
150 TABLET, FILM COATED, EXTENDED RELEASE ORAL DAILY
Status: DISCONTINUED | OUTPATIENT
Start: 2024-08-24 | End: 2024-08-24 | Stop reason: HOSPADM

## 2024-08-24 RX ORDER — TRAMADOL HYDROCHLORIDE 50 MG/1
50 TABLET ORAL EVERY 6 HOURS PRN
Qty: 20 TABLET | Refills: 0 | Status: SHIPPED | OUTPATIENT
Start: 2024-08-24 | End: 2024-08-29

## 2024-08-24 RX ADMIN — ASPIRIN 81 MG: 81 TABLET, COATED ORAL at 08:45

## 2024-08-24 RX ADMIN — PANTOPRAZOLE SODIUM 40 MG: 40 TABLET, DELAYED RELEASE ORAL at 06:12

## 2024-08-24 RX ADMIN — ACETAMINOPHEN 650 MG: 325 TABLET ORAL at 10:06

## 2024-08-24 RX ADMIN — METOPROLOL TARTRATE 25 MG: 25 TABLET, FILM COATED ORAL at 08:45

## 2024-08-24 RX ADMIN — BUPROPION HYDROCHLORIDE 150 MG: 150 TABLET, EXTENDED RELEASE ORAL at 10:03

## 2024-08-24 RX ADMIN — CLOPIDOGREL BISULFATE 75 MG: 75 TABLET ORAL at 08:45

## 2024-08-24 RX ADMIN — ESCITALOPRAM OXALATE 20 MG: 10 TABLET ORAL at 08:45

## 2024-08-24 RX ADMIN — CARBIDOPA AND LEVODOPA 1 TABLET: 25; 100 TABLET ORAL at 08:45

## 2024-08-24 ASSESSMENT — PAIN DESCRIPTION - ORIENTATION: ORIENTATION: RIGHT;LEFT

## 2024-08-24 ASSESSMENT — PAIN DESCRIPTION - DESCRIPTORS: DESCRIPTORS: SORE

## 2024-08-24 ASSESSMENT — PAIN SCALES - GENERAL: PAINLEVEL_OUTOF10: 3

## 2024-08-24 ASSESSMENT — PAIN DESCRIPTION - LOCATION: LOCATION: INCISION

## 2024-08-24 NOTE — PLAN OF CARE
End of Shift Note    Bedside shift change report given to Shae (oncoming nurse) by Ayaan Beaver RN (offgoing nurse).  Report included the following information SBAR, Kardex, Intake/Output, MAR, Recent Results, Med Rec Status, Cardiac Rhythm NSR, and Alarm Parameters     Shift worked:  7P-7A     Shift summary and any significant changes:     No acute changes noted  Waddell catheter removed at 6am     Concerns for physician to address:  none     Zone phone for oncoming shift:   [phone]       Activity:     Number times ambulated in hallways past shift: 0  Number of times OOB to chair past shift: 0    Cardiac:   Cardiac Monitoring: Yes           Access:  Current line(s): PIV     Genitourinary:   Urinary status: waddell    Respiratory:      Chronic home O2 use?: NO  Incentive spirometer at bedside: NO       GI:     Current diet:  ADULT DIET; Regular  Passing flatus: YES  Tolerating current diet: YES       Pain Management:   Patient states pain is manageable on current regimen: YES    Skin:     Interventions: increase time out of bed and internal/external urinary devices    Patient Safety:  Fall Score:    Interventions: bed/chair alarm, gripper socks, and pt to call before getting OOB         Length of Stay:  Expected LOS: 3  Actual LOS: 0      Ayaan Beaver RN    Predictive Model Details          18 (Normal)  Factor Value    Calculated 8/23/2024 23:27 89% Age 73 years old    Deterioration Index Model 7% Systolic 130     2% Pulse 63     1% Temperature 97.6 °F (36.4 °C)     0% Pulse oximetry 96 %     0% Respiratory rate 16             Problem: Safety - Adult  Goal: Free from fall injury  Outcome: Progressing     Problem: ABCDS Injury Assessment  Goal: Absence of physical injury  Outcome: Progressing     Problem: Discharge Planning  Goal: Discharge to home or other facility with appropriate resources  Outcome: Progressing     Problem: Pain  Goal: Verbalizes/displays adequate comfort level or baseline comfort  level  Outcome: Progressing

## 2024-08-24 NOTE — DISCHARGE SUMMARY
Discharge Summary     Patient: Melyssa Mcarthur MRN: [id number]  SSN: [id number]    YOB: 1950  Age: 73 y.o.  Sex: female       Admit Date: 8/23/2024    Discharge Date: 8/24/2024      Admission Diagnoses: PAD (peripheral artery disease) ([facility]) [I73.9]  Critical lower limb ischemia ([facility]) [I70.229]    Discharge Diagnoses:  Same    Discharge Condition: Good    Hospital Course: Uncomplicated R FEA. Very eager for discharge on POD1. Did well ambulating. Minimal pain. Discharged to home on POD1    Disposition: home    Discharge Medications:   Discharge Medication List as of 8/24/2024 12:22 PM        START taking these medications    Details   traMADol (ULTRAM) 50 MG tablet Take 1 tablet by mouth every 6 hours as needed for Pain for up to 5 days. Intended supply: 5 days. Take lowest dose possible to manage pain Max Daily Amount: 200 mg, Disp-20 tablet, R-0Normal           CONTINUE these medications which have NOT CHANGED    Details   ARIPiprazole (ABILIFY) 15 MG tablet nightly.Historical Med      buPROPion (WELLBUTRIN SR) 150 MG extended release tablet Take by mouth dailyHistorical Med      carbidopa-levodopa (SINEMET)  MG per tablet Take 1 tablet by mouthHistorical Med      cyclobenzaprine (FLEXERIL) 10 MG tablet Take by mouth 2 times daily as neededHistorical Med      escitalopram (LEXAPRO) 20 MG tablet Take by mouth dailyHistorical Med      esomeprazole (NEXIUM) 40 MG delayed release capsule Take by mouth every morning (before breakfast)Historical Med      fexofenadine (ALLEGRA) 180 MG tablet Take by mouth dailyHistorical Med      metoprolol tartrate (LOPRESSOR) 25 MG tablet Take by mouth 2 times dailyHistorical Med      aspirin 81 MG EC tablet Take 1 tablet by mouth dailyHistorical Med      clopidogrel (PLAVIX) 75 MG tablet Take by mouth dailyHistorical Med           STOP taking these medications       gabapentin (NEURONTIN) 600 MG tablet Comments:   Reason for Stopping:

## 2024-08-24 NOTE — PROGRESS NOTES
PHYSICAL THERAPY EVALUATION/DISCHARGE    Patient: Melyssa Mcarthur (73 y.o. female)  Date: 8/24/2024  Primary Diagnosis: PAD (peripheral artery disease) ([facility]) [I73.9]  Critical lower limb ischemia ([facility]) [I70.229]  Procedure(s) (LRB):  RIGHT FEMORAL ENDARTERECTOMY (Right) 1 Day Post-Op   Precautions:                        ASSESSMENT AND RECOMMENDATIONS:  Based on the objective data below, the patient was lying in bed when PT arrived. Currently demonstrates independent skill with bed mobility, transfers and ambulation including up/down 4 steps with 1 rail. Tolerated ambulation for 225 feet - gait speed decreased with decreased step lengths, decreased arm swing and no LOB. She reports no pain during the session. Cleared for dc from PT standpoint. No PT needs post discharge recommended.    Functional Outcome Measure:  The patient scored 24/24 on the [facility] outcome measure which is indicative of not needing SNF or  rehab services post hospitalization.      Further skilled acute physical therapy is not indicated at this time. Will sign off.       PLAN :  Recommendation for discharge: (in order for the patient to meet his/her long term goals): No skilled physical therapy  No skilled physical therapy  Other factors to consider for discharge: no additional factors    IF patient discharges home will need the following DME: none       SUBJECTIVE:   Patient stated “ I'm ready to get home. ”    OBJECTIVE DATA SUMMARY:     Past Medical History:   Diagnosis Date    Arthritis     CAD (coronary artery disease)     ZARINA placed    Depression     Falls     Fatigue     GERD (gastroesophageal reflux disease)     HTN (hypertension)     SOB (shortness of breath)     Stroke (cerebrum) ([facility]) 02/2023    VCS     Past Surgical History:   Procedure Laterality Date    CAROTID STENT PLACEMENT Right 03/2023    CATARACT REMOVAL Bilateral     CHOLECYSTECTOMY      GASTRIC BYPASS SURGERY  1996    HYSTERECTOMY (CERVIX STATUS UNKNOWN)      ORTHOPEDIC  Intervention(s):   pain is at a level acceptable to the patient    Activity Tolerance:   Good    After treatment:   Patient left in no apparent distress sitting up in chair and Call bell within reach      COMMUNICATION/EDUCATION:   The patient's plan of care was discussed with: occupational therapist    Patient Education  Education Given To: Patient  Education Provided: Role of Therapy  Education Method: Demonstration;Verbal;Teach Back  Barriers to Learning: None  Education Outcome: Verbalized understanding;Demonstrated understanding    Thank you for this referral.  Iraj Tucker, PT  Minutes: 11      Physical Therapy Evaluation Charge Determination   History Examination Presentation Decision-Making   HIGH Complexity :3+ comorbidities / personal factors will impact the outcome/ POC  HIGH Complexity : 4+ Standardized tests and measures addressing body structure, function, activity limitation and / or participation in recreation  LOW Complexity : Stable, uncomplicated  AM-PAC  LOW      Based on the above components, the patient evaluation is determined to be of the following complexity level: Low

## 2024-08-24 NOTE — PROGRESS NOTES
I have reviewed Discharge Instructions with the patient. The patient verbalized understanding. Discharge medications reviewed with patient along with appropriate educational materials.  Opportunity for questions and clarification was provided. All lines removed without difficulty. Right groin surgical  sites are clean, dry, and intact. Patient's belongings gathered and with patient. Patient is ready for discharge.  Pt taken down to discharge area.

## 2024-08-24 NOTE — PROGRESS NOTES
Doing very well this morning.    Super eager for discharge.    R groin incision is CDI.  RLE with pedal signals.     R FEA  --OK for discharge to home today if she does well with PT/OT

## 2024-08-24 NOTE — PROGRESS NOTES
OCCUPATIONAL THERAPY EVALUATION/DISCHARGE  Patient: Melyssa Mcarthur (73 y.o. female)  Date: 8/24/2024  Primary Diagnosis: PAD (peripheral artery disease) ([facility]) [I73.9]  Critical lower limb ischemia ([facility]) [I70.229]  Procedure(s) (LRB):  RIGHT FEMORAL ENDARTERECTOMY (Right) 1 Day Post-Op     Precautions: None                  ASSESSMENT :  Based on the objective data below, the patient presents with cautious mobility with slight balance deficits with mobility without shoes on.  With shoes on pt has good balance normal rate of speed with ADL mobility.  She reports that she wears shoes at all times when she is walking at home.  Educated pt on the benefits of sitting to don low body clothing and then standing to pull up to prevent falls.  Pt voiced understanding.  Pt is performing ADLs at independent to modified independent level.  Further OT services aren't needed at this time and pt is in agreement.      Supine  111/59  Sitting 101/55  Standing 138/60    Functional Outcome Measure:  The patient scored 100/100 on the barthel outcome measure which is indicative of independence in mobility and ADLS.      Further skilled acute occupational therapy is not indicated at this time.     PLAN :  Recommend with staff: mobilize    Recommendation for discharge: (in order for the patient to meet his/her long term goals): No skilled occupational therapy    Other factors to consider for discharge: no additional factors    IF patient discharges home will need the following DME: none     SUBJECTIVE:   Patient stated, “My pain is gone.”    OBJECTIVE DATA SUMMARY:     Past Medical History:   Diagnosis Date    Arthritis     CAD (coronary artery disease)     ZARINA placed    Depression     Falls     Fatigue     GERD (gastroesophageal reflux disease)     HTN (hypertension)     SOB (shortness of breath)     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 However direct testing is not needed.  5. Usually the patient's performance over the preceding 24-48 hours is important, but occasionally longer periods will be relevant.  6. Middle categories imply that the patient supplies over 50 per cent of the effort.  7. Use of aids to be independent is allowed.    Score Interpretation (from Sinoff )    Independent   60-79 Minimally independent   40-59 Partially dependent   20-39 Very dependent   <20 Totally dependent     Michelle Gaston., BarthelSINW. (1965). Functional evaluation: the Barthel Index. Md State Med J (142.  RADHA AshrafF, MARISA Rahman., [place], DERRELL., Ruddy, BENSON. (1999). Measuring the change indisability after inpatient rehabilitation; comparison of the responsiveness of the Barthel Index and Functional [place] Measure. Journal of Neurology, Neurosurgery, and Psychiatry, 66(4), 480-484.  Van Elena, N.J.A, NICOLE Corral, & Diane M.A. (2004.) Assessment of post-stroke quality of life in cost-effectiveness studies: The usefulness of the Barthel Index and the EuroQoL-5D. Quality of Life Research, 13, 427-43       Pain Ratin/10     Activity Tolerance:   Good    After treatment:   Patient left in no apparent distress sitting up in chair, Call bell within reach, and Updated patient's board on functional status and mobility recommendations    COMMUNICATION/EDUCATION:   The patient's plan of care was discussed with: physical therapist, registered nurse, and patient    Patient Education  Education Given To: Patient  Education Provided: Role of Therapy;Plan of Care;Fall Prevention Strategies  Education Method: Verbal  Barriers to Learning: None  Education Outcome: Verbalized understanding    Thank you for this referral.  Fabiola Swain OTR/L  Minutes: 12    Occupational Therapy Evaluation Charge Determination   History Examination Decision-Making

## 2024-09-13 NOTE — OP NOTE
[facility]              [address] [place] [address]                            OPERATIVE REPORT      PATIENT NAME: AMBIKA VELASQUEZ          : 1950  MED REC NO: [id number]                       ROOM: [id number]  ACCOUNT NO: [id number]                       ADMIT DATE: 2024  PROVIDER: Dong Dewey MD    DATE OF SERVICE:  2024    PREOPERATIVE DIAGNOSES:  Peripheral artery disease with claudication.    POSTOPERATIVE DIAGNOSES:  Peripheral artery disease with claudication.    PROCEDURES PERFORMED:  Right femoral endarterectomy and patch angioplasty.    SURGEON:  Dong Dewey MD    ASSISTANT:  None.    ANESTHESIA:  General.    ESTIMATED BLOOD LOSS:  300 mL.    SPECIMENS REMOVED:  None.    INTRAOPERATIVE FINDINGS:  Near occlusive plaque in right common femoral artery and proximal SFA.  Good result with endarterectomy.     COMPLICATIONS:  None.    IMPLANTS:  Bovine pericardial patch.    INDICATIONS:  The patient is a 73-year-old female who has exertional right leg pain.  She was scheduled for a right knee replacement when further evaluation revealed near occlusive plaque in the right common femoral artery and proximal SFA.  She presents for femoral endarterectomy.    DESCRIPTION OF PROCEDURE:  After informed consent was obtained, the patient was given preoperative intravenous antibiotics within 1 hour of the incision.  She was taken to the operating room and after induction of adequate general anesthesia, the right groin was prepped and draped as a sterile field.  Through a transverse groin incision, the common femoral artery was dissected free from the inguinal ligament to the femoral bifurcation.  The proximal common femoral artery and profunda were encircled with vessel loops.  The SFA was dissected free about 5 cm distal to the origin and encircled with a vessel loop.  The patient was systemically heparinized.  There was palpable

## (undated) DEVICE — GLOVE SURG SZ 8 L12IN FNGR THK94MIL STD WHT LTX FREE

## (undated) DEVICE — 3M™ MEDIPORE™ H SOFT CLOTH SURGICAL TAPE 2864, 4 INCH X 10 YARD (10CM X 9,14M), 12 ROLLS/CASE: Brand: 3M™ MEDIPORE™

## (undated) DEVICE — SUTURE PROL SZ 6-0 L24IN NONABSORBABLE BLU L9.3MM BV-1 3/8 8805H

## (undated) DEVICE — GOWN,SIRUS,NONRNF,SETINSLV,2XL,18/CS: Brand: MEDLINE

## (undated) DEVICE — STAPLER SKIN H3.9MM WIRE DIA0.58MM CRWN 6.9MM 35 STPL ROT

## (undated) DEVICE — 3M™ IOBAN™ 2 ANTIMICROBIAL INCISE DRAPE 6650EZ: Brand: IOBAN™ 2

## (undated) DEVICE — SUTURE VICRYL SZ 2-0 L36IN ABSRB UD L36MM CT-1 1/2 CIR J945H

## (undated) DEVICE — SPONGE GZ W4XL4IN COT 12 PLY TYP VII WVN C FLD DSGN STERILE

## (undated) DEVICE — SPONGE: SPECIALTY PEANUT XR 100/CS: Brand: MEDICAL ACTION INDUSTRIES

## (undated) DEVICE — SUTURE VICRYL + SZ 3-0 L27IN ABSRB UD L26MM SH 1/2 CIR VCP416H

## (undated) DEVICE — SUTURE PROL SZ 5-0 L24IN NONABSORBABLE BLU RB-2 L13IN 1/2 8554H

## (undated) DEVICE — AGENT HEMSTAT W4XL4IN OXIDIZED REGENERATED CELOS ABSRB SFT

## (undated) DEVICE — BOWL MED L 32OZ PLAS W/ MOLD GRAD EZ OPN PEEL PCH

## (undated) DEVICE — SOLUTION IV 500ML 0.9% SOD CHL PH 5 INJ USP VIAFLX PLAS

## (undated) DEVICE — SUTURE PERMA-HAND SZ 2-0 L30IN NONABSORBABLE BLK L26MM SH K833H

## (undated) DEVICE — VASCULAR-MRMC: Brand: MEDLINE INDUSTRIES, INC.

## (undated) DEVICE — 1LYRTR 16FR10ML100%SIL UMS SNP: Brand: MEDLINE INDUSTRIES, INC.

## (undated) DEVICE — LOOP,VESSEL,MAXI,BLUE,2/PK,STERILE: Brand: MEDLINE

## (undated) DEVICE — PLEDGET VASC W3/16XL3/8IN THK1/16IN PTFE SFT

## (undated) DEVICE — BLADE CLIPPER GEN PURP NS

## (undated) DEVICE — SHEARS ENDOSCP L9CM CRV HARM FOCS +

## (undated) DEVICE — PROBE VASC 8MHZ WTRPRF